# Patient Record
Sex: FEMALE | Race: BLACK OR AFRICAN AMERICAN | HISPANIC OR LATINO | Employment: FULL TIME | URBAN - METROPOLITAN AREA
[De-identification: names, ages, dates, MRNs, and addresses within clinical notes are randomized per-mention and may not be internally consistent; named-entity substitution may affect disease eponyms.]

---

## 2022-09-29 ENCOUNTER — OFFICE VISIT (OUTPATIENT)
Dept: FAMILY MEDICINE CLINIC | Facility: CLINIC | Age: 52
End: 2022-09-29
Payer: COMMERCIAL

## 2022-09-29 VITALS
DIASTOLIC BLOOD PRESSURE: 90 MMHG | OXYGEN SATURATION: 98 % | RESPIRATION RATE: 16 BRPM | SYSTOLIC BLOOD PRESSURE: 160 MMHG | HEIGHT: 67 IN | BODY MASS INDEX: 33.59 KG/M2 | WEIGHT: 214 LBS | HEART RATE: 85 BPM

## 2022-09-29 DIAGNOSIS — I10 BENIGN ESSENTIAL HYPERTENSION: ICD-10-CM

## 2022-09-29 DIAGNOSIS — G47.00 INSOMNIA, UNSPECIFIED TYPE: ICD-10-CM

## 2022-09-29 DIAGNOSIS — F41.8 DEPRESSION WITH ANXIETY: Primary | ICD-10-CM

## 2022-09-29 PROBLEM — Z80.0 FAMILY HISTORY OF COLON CANCER IN MOTHER: Status: ACTIVE | Noted: 2020-11-09

## 2022-09-29 PROBLEM — D24.2 FIBROADENOMA OF LEFT BREAST: Status: ACTIVE | Noted: 2020-12-08

## 2022-09-29 PROCEDURE — 99204 OFFICE O/P NEW MOD 45 MIN: CPT | Performed by: FAMILY MEDICINE

## 2022-09-29 RX ORDER — METOPROLOL TARTRATE 50 MG/1
50 TABLET, FILM COATED ORAL 2 TIMES DAILY
COMMUNITY

## 2022-09-29 RX ORDER — IBUPROFEN 800 MG/1
800 TABLET ORAL EVERY 6 HOURS PRN
COMMUNITY
Start: 2022-08-22 | End: 2022-09-29 | Stop reason: ALTCHOICE

## 2022-09-29 RX ORDER — OMEPRAZOLE 20 MG/1
20 TABLET, DELAYED RELEASE ORAL DAILY
COMMUNITY
Start: 2022-06-16 | End: 2022-10-12 | Stop reason: SDUPTHER

## 2022-09-29 RX ORDER — ROSUVASTATIN CALCIUM 20 MG/1
TABLET, COATED ORAL
COMMUNITY
Start: 2022-05-04

## 2022-09-29 RX ORDER — ACETAMINOPHEN 500 MG
500 TABLET ORAL EVERY 6 HOURS PRN
COMMUNITY

## 2022-09-29 RX ORDER — CELECOXIB 200 MG/1
200 CAPSULE ORAL 2 TIMES DAILY
COMMUNITY
Start: 2022-06-16 | End: 2023-06-16

## 2022-09-29 RX ORDER — LOSARTAN POTASSIUM 100 MG/1
100 TABLET ORAL DAILY
COMMUNITY
End: 2022-10-12 | Stop reason: SDUPTHER

## 2022-09-29 RX ORDER — SERTRALINE HYDROCHLORIDE 25 MG/1
25 TABLET, FILM COATED ORAL DAILY
Qty: 30 TABLET | Refills: 5 | Status: SHIPPED | OUTPATIENT
Start: 2022-09-29 | End: 2022-10-12 | Stop reason: DRUGHIGH

## 2022-09-29 NOTE — ASSESSMENT & PLAN NOTE
She says she has history of depression many years ago but she did not get any treatment now due to situation she is in separation with the partner and have been under more stress she lost her home now she is living with her daughter, she is taking her blood pressure medicine but blood pressure is up because she cannot sleep due to the stress and she is tearful  She needs a form to be filled for the [facility] as she has not been working since end of August and she needs a documentation from the doctor  She says she will bring the form next time

## 2022-09-29 NOTE — PROGRESS NOTES
Name: Go Lynch      : 1970      MRN: [id number]  Encounter Provider: Anibal Pavon MD  Encounter Date: 2022   Encounter department: [facility]     1  Depression with anxiety  Assessment & Plan:  She says she has history of depression many years ago but she did not get any treatment now due to situation she is in separation with the partner and have been under more stress she lost her home now she is living with her daughter, she is taking her blood pressure medicine but blood pressure is up because she cannot sleep due to the stress and she is tearful  She needs a form to be filled for the [facility] as she has not been working since  and she needs a documentation from the doctor  She says she will bring the form next time    Orders:  -     sertraline (Zoloft) 25 mg tablet; Take 1 tablet (25 mg total) by mouth daily  -     Ambulatory Referral to [facility]; Future    2  Benign essential hypertension  Assessment & Plan: On metoprolol 50 mg twice a day and she says her blood pressure is up as she could not sleep      3  Insomnia, unspecified type  Assessment & Plan:  High stress due to separation, and unable to sleep and focus and feels depressed and tearful    Orders:  -     Ambulatory Referral to [facility];  Future    Referral made to a counselor     interpretor service used   Dates she is off from  - 10/13  Works in [address] [place] [address]     She is a new patient, she is [place]-speaking and needs , she says she had in relationship for many years and now she is in separation, she is going through a lot she has been very anxious tearful depress cannot work and she has not been seeing any counselor, she works in [facility] and since  she has not been working and they needed documentation from the doctor and forms need to be filled that what condition she is going through, and she says the blood pressure is up because she cannot sleep , due to anxiety, she says she is also not living in her own home she is now living with her daughter which she is increasing her stress    Review of Systems   Constitutional: Negative for activity change, appetite change, chills, fatigue, fever and unexpected weight change  HENT: Negative for congestion, ear discharge, ear pain, nosebleeds, postnasal drip, rhinorrhea, sinus pressure, sneezing, sore throat, trouble swallowing and voice change  Eyes: Negative for photophobia, pain, discharge, redness and itching  Respiratory: Negative for cough, chest tightness, shortness of breath and wheezing  Cardiovascular: Negative for chest pain, palpitations and leg swelling  Gastrointestinal: Negative for abdominal pain, constipation, diarrhea, nausea and vomiting  Endocrine: Negative for polyuria  Genitourinary: Negative for dysuria, frequency and urgency  Musculoskeletal: Negative for arthralgias, back pain, myalgias and neck pain  Skin: Negative for color change, pallor and rash  Allergic/Immunologic: Negative for environmental allergies and food allergies  Neurological: Negative for dizziness, weakness, light-headedness and headaches  Hematological: Negative for adenopathy  Does not bruise/bleed easily  Psychiatric/Behavioral: Positive for decreased concentration, dysphoric mood and sleep disturbance  Negative for behavioral problems  The patient is nervous/anxious  Past Medical History:   Diagnosis Date    Hypertension      History reviewed  No pertinent surgical history  History reviewed  No pertinent family history    Social History     Socioeconomic History    Marital status: Single     Spouse name: None    Number of children: None    Years of education: None    Highest education level: None   Occupational History    None   Tobacco Use    Smoking status: Never Smoker    Smokeless tobacco: None   Substance and Sexual Activity  Alcohol use: Never    Drug use: None    Sexual activity: None   Other Topics Concern    None   Social History Narrative    None     Social Determinants of Health     Financial Resource Strain: Not on file   Food Insecurity: Not on file   Transportation Needs: Not on file   Physical Activity: Not on file   Stress: Not on file   Social Connections: Not on file   Intimate Partner Violence: Not on file   Housing Stability: Not on file     Current Outpatient Medications on File Prior to Visit   Medication Sig    acetaminophen (TYLENOL) 500 mg tablet Take 500 mg by mouth every 6 (six) hours as needed    celecoxib (CeleBREX) 200 mg capsule Take 200 mg by mouth 2 (two) times a day    Diclofenac Sodium (VOLTAREN) 1 % Apply 1 application topically    losartan (COZAAR) 100 MG tablet Take 100 mg by mouth daily    metoprolol tartrate (LOPRESSOR) 50 mg tablet Take 50 mg by mouth 2 (two) times a day    omeprazole (PriLOSEC OTC) 20 MG tablet Take 20 mg by mouth daily    [DISCONTINUED] ibuprofen (MOTRIN) 800 mg tablet Take 800 mg by mouth every 6 (six) hours as needed    celecoxib (CeleBREX) 200 mg capsule Take 200 mg by mouth 2 (two) times a day    rosuvastatin (CRESTOR) 20 MG tablet  (Patient not taking: Reported on 9/29/2022)     No Known Allergies  Immunization History   Administered Date(s) Administered    COVID-19 MODERNA VACC 0 5 ML IM 04/29/2021       Objective     /90   Pulse 85   Resp 16   Ht 5' 7" (1 702 m)   Wt 97 1 kg (214 lb)   SpO2 98%   BMI 33 52 kg/m²     Physical Exam  Vitals and nursing note reviewed  Constitutional:       Appearance: She is well-developed  HENT:      Head: Normocephalic and atraumatic  Eyes:      General: No scleral icterus  Conjunctiva/sclera: Conjunctivae normal       Pupils: Pupils are equal, round, and reactive to light  Neck:      Thyroid: No thyromegaly  Cardiovascular:      Rate and Rhythm: Normal rate     Pulmonary:      Effort: Pulmonary effort is normal       Breath sounds: Normal breath sounds  No wheezing or rales  Musculoskeletal:      Cervical back: Normal range of motion and neck supple  Skin:     Findings: No erythema or rash  Neurological:      Mental Status: She is alert     Psychiatric:      Comments: Tearful and anxious       Glo Hernandez MD

## 2022-10-05 ENCOUNTER — TELEPHONE (OUTPATIENT)
Dept: PSYCHIATRY | Facility: CLINIC | Age: 52
End: 2022-10-05

## 2022-10-05 NOTE — TELEPHONE ENCOUNTER
Used interpretor services to contact patient in regards to patient referral  Inform patient of the wait list and have added patient to wait list for talk therapy  Suggested calling insurance for other providers along with ER if needed

## 2022-10-12 ENCOUNTER — OFFICE VISIT (OUTPATIENT)
Dept: FAMILY MEDICINE CLINIC | Facility: CLINIC | Age: 52
End: 2022-10-12
Payer: COMMERCIAL

## 2022-10-12 VITALS
WEIGHT: 214 LBS | HEART RATE: 73 BPM | RESPIRATION RATE: 16 BRPM | DIASTOLIC BLOOD PRESSURE: 90 MMHG | HEIGHT: 67 IN | OXYGEN SATURATION: 99 % | BODY MASS INDEX: 33.59 KG/M2 | SYSTOLIC BLOOD PRESSURE: 140 MMHG

## 2022-10-12 DIAGNOSIS — G47.00 INSOMNIA, UNSPECIFIED TYPE: ICD-10-CM

## 2022-10-12 DIAGNOSIS — Z23 NEEDS FLU SHOT: ICD-10-CM

## 2022-10-12 DIAGNOSIS — I10 BENIGN ESSENTIAL HYPERTENSION: ICD-10-CM

## 2022-10-12 DIAGNOSIS — K21.9 GASTROESOPHAGEAL REFLUX DISEASE WITHOUT ESOPHAGITIS: ICD-10-CM

## 2022-10-12 DIAGNOSIS — F41.8 DEPRESSION WITH ANXIETY: Primary | ICD-10-CM

## 2022-10-12 PROCEDURE — 90471 IMMUNIZATION ADMIN: CPT | Performed by: FAMILY MEDICINE

## 2022-10-12 PROCEDURE — 90682 RIV4 VACC RECOMBINANT DNA IM: CPT | Performed by: FAMILY MEDICINE

## 2022-10-12 PROCEDURE — 99214 OFFICE O/P EST MOD 30 MIN: CPT | Performed by: FAMILY MEDICINE

## 2022-10-12 RX ORDER — OMEPRAZOLE 20 MG/1
20 TABLET, DELAYED RELEASE ORAL DAILY
Qty: 90 TABLET | Refills: 1 | Status: SHIPPED | OUTPATIENT
Start: 2022-10-12

## 2022-10-12 RX ORDER — LOSARTAN POTASSIUM 100 MG/1
100 TABLET ORAL DAILY
Qty: 90 TABLET | Refills: 1 | Status: SHIPPED | OUTPATIENT
Start: 2022-10-12

## 2022-10-12 NOTE — ASSESSMENT & PLAN NOTE
Her insomnia is mostly due to stress related depression anxiety due to her situation of separation, she has been taking Zoloft 25 mg for 10 days, will increase the dose 50 mg daily    And then advised to really check in 3 weeks and a work note is given for excuse for 3 weeks from now and she will be re-evaluated in 3 weeks

## 2022-10-12 NOTE — PROGRESS NOTES
Name: Ximena Pugh      : 1970      MRN: [id number]  Encounter Provider: Uday Jack MD  Encounter Date: 10/12/2022   Encounter department: [facility]     1  Depression with anxiety  Assessment & Plan:  She was started on Zoloft 25 mg  10 days ago and will increase the dose 50 mg as she still feel not significant improvement and she has extended a note for work excuse given for 3 more weeks so she start feeling better    Orders:  -     sertraline (Zoloft) 50 mg tablet; Take 1 tablet (50 mg total) by mouth daily    2  Needs flu shot  -     influenza vaccine, quadrivalent, recombinant, PF, 0 5 mL, for patients 18 yr+ (FLUBLOK)    3  Gastroesophageal reflux disease without esophagitis  -     omeprazole (PriLOSEC OTC) 20 MG tablet; Take 1 tablet (20 mg total) by mouth daily    4  Benign essential hypertension  -     losartan (COZAAR) 100 MG tablet; Take 1 tablet (100 mg total) by mouth daily    5  Insomnia, unspecified type  Assessment & Plan:  Her insomnia is mostly due to stress related depression anxiety due to her situation of separation, she has been taking Zoloft 25 mg for 10 days, will increase the dose 50 mg daily  And then advised to really check in 3 weeks and a work note is given for excuse for 3 weeks from now and she will be re-evaluated in 3 weeks        BMI Counseling: Body mass index is 33 52 kg/m²  The BMI is above normal  Nutrition recommendations include decreasing portion sizes  Rationale for BMI follow-up plan is due to patient being overweight or obese  Subjective     She is here for follow-up, she was started on Zoloft 25 mg 10 days ago because of her high stress due to separation, she speaks [place] and  service was used    She says she is taking Zoloft at bedtime and she can sleep only 3 hours then she wakes up and her mind does not shorten she keeps thinking about things and she is stressed she does not feel well she feel depressed, and she says she had taken the latter and she has to return back to work now as she works in [address] [employer] [address] but she feels she is still not able to concentrate and does not feel quite normal she needs few more weeks to feel better so she can go back to work  She says her lack of sleep also fact her mood  Review of Systems   Constitutional: Negative for activity change, appetite change, chills, fatigue, fever and unexpected weight change  HENT: Negative for congestion, ear discharge, ear pain, nosebleeds, postnasal drip, rhinorrhea, sinus pressure, sneezing, sore throat, trouble swallowing and voice change  Eyes: Negative for photophobia, pain, discharge, redness and itching  Respiratory: Negative for cough, chest tightness, shortness of breath and wheezing  Cardiovascular: Negative for chest pain, palpitations and leg swelling  Gastrointestinal: Negative for abdominal pain, constipation, diarrhea, nausea and vomiting  Endocrine: Negative for polyuria  Genitourinary: Negative for dysuria, frequency and urgency  Musculoskeletal: Negative for arthralgias, back pain, myalgias and neck pain  Skin: Negative for color change, pallor and rash  Allergic/Immunologic: Negative for environmental allergies and food allergies  Neurological: Negative for dizziness, weakness, light-headedness and headaches  Hematological: Negative for adenopathy  Does not bruise/bleed easily  Psychiatric/Behavioral: Positive for dysphoric mood and sleep disturbance  Negative for behavioral problems  The patient is nervous/anxious  Past Medical History:   Diagnosis Date   • Hypertension      History reviewed  No pertinent surgical history  History reviewed  No pertinent family history    Social History     Socioeconomic History   • Marital status: Single     Spouse name: None   • Number of children: None   • Years of education: None   • Highest education level: None   Occupational History   • None Tobacco Use   • Smoking status: Never Smoker   • Smokeless tobacco: Never Used   Substance and Sexual Activity   • Alcohol use: Never   • Drug use: None   • Sexual activity: None   Other Topics Concern   • None   Social History Narrative   • None     Social Determinants of Health     Financial Resource Strain: Not on file   Food Insecurity: Not on file   Transportation Needs: Not on file   Physical Activity: Not on file   Stress: Not on file   Social Connections: Not on file   Intimate Partner Violence: Not on file   Housing Stability: Not on file     Current Outpatient Medications on File Prior to Visit   Medication Sig   • acetaminophen (TYLENOL) 500 mg tablet Take 500 mg by mouth every 6 (six) hours as needed   • celecoxib (CeleBREX) 200 mg capsule Take 200 mg by mouth 2 (two) times a day   • Diclofenac Sodium (VOLTAREN) 1 % Apply 1 application topically   • metoprolol tartrate (LOPRESSOR) 50 mg tablet Take 50 mg by mouth 2 (two) times a day   • [DISCONTINUED] losartan (COZAAR) 100 MG tablet Take 100 mg by mouth daily   • [DISCONTINUED] omeprazole (PriLOSEC OTC) 20 MG tablet Take 20 mg by mouth daily   • [DISCONTINUED] sertraline (Zoloft) 25 mg tablet Take 1 tablet (25 mg total) by mouth daily   • rosuvastatin (CRESTOR) 20 MG tablet  (Patient not taking: No sig reported)     No Known Allergies  Immunization History   Administered Date(s) Administered   • COVID-19 MODERNA VACC 0 5 ML IM 04/29/2021   • Influenza, recombinant, quadrivalent,injectable, preservative free 10/12/2022       Objective     /90   Pulse 73   Resp 16   Ht 5' 7" (1 702 m)   Wt 97 1 kg (214 lb)   SpO2 99%   BMI 33 52 kg/m²     Physical Exam  Vitals and nursing note reviewed  Constitutional:       Appearance: She is well-developed  HENT:      Head: Normocephalic  Eyes:      General: No scleral icterus  Conjunctiva/sclera: Conjunctivae normal       Pupils: Pupils are equal, round, and reactive to light     Neck: Thyroid: No thyromegaly  Cardiovascular:      Rate and Rhythm: Normal rate and regular rhythm  Heart sounds: Normal heart sounds  No murmur heard  Pulmonary:      Effort: Pulmonary effort is normal       Breath sounds: Normal breath sounds  No wheezing or rales  Musculoskeletal:      Cervical back: Normal range of motion and neck supple  Right lower leg: No edema  Left lower leg: No edema  Lymphadenopathy:      Cervical: No cervical adenopathy  Skin:     Findings: No erythema or rash  Neurological:      Mental Status: She is alert     Psychiatric:      Comments: Anxious       Lux Mendoza MD

## 2022-10-12 NOTE — ASSESSMENT & PLAN NOTE
She was started on Zoloft 25 mg  10 days ago and will increase the dose 50 mg as she still feel not significant improvement and she has extended a note for work excuse given for 3 more weeks so she start feeling better

## 2023-03-21 ENCOUNTER — APPOINTMENT (EMERGENCY)
Dept: VASCULAR ULTRASOUND | Facility: HOSPITAL | Age: 53
End: 2023-03-21

## 2023-03-21 ENCOUNTER — APPOINTMENT (EMERGENCY)
Dept: RADIOLOGY | Facility: HOSPITAL | Age: 53
End: 2023-03-21

## 2023-03-21 ENCOUNTER — HOSPITAL ENCOUNTER (EMERGENCY)
Facility: HOSPITAL | Age: 53
Discharge: HOME | End: 2023-03-21
Attending: EMERGENCY MEDICINE | Admitting: EMERGENCY MEDICINE

## 2023-03-21 VITALS
WEIGHT: 198.63 LBS | OXYGEN SATURATION: 100 % | SYSTOLIC BLOOD PRESSURE: 192 MMHG | BODY MASS INDEX: 31.11 KG/M2 | TEMPERATURE: 98 F | HEART RATE: 82 BPM | RESPIRATION RATE: 16 BRPM | DIASTOLIC BLOOD PRESSURE: 86 MMHG

## 2023-03-21 DIAGNOSIS — M25.562 LEFT KNEE PAIN, UNSPECIFIED CHRONICITY: Primary | ICD-10-CM

## 2023-03-21 RX ORDER — LIDOCAINE 50 MG/G
1 PATCH TOPICAL EVERY 24 HOURS
Qty: 15 PATCH | Refills: 0 | Status: SHIPPED | OUTPATIENT
Start: 2023-03-21

## 2023-03-21 RX ORDER — ACETAMINOPHEN 500 MG
1000 TABLET ORAL EVERY 8 HOURS PRN
Qty: 60 TABLET | Refills: 0 | Status: SHIPPED | OUTPATIENT
Start: 2023-03-21

## 2023-03-21 RX ORDER — ACETAMINOPHEN 325 MG/1
975 TABLET ORAL ONCE
Status: COMPLETED | OUTPATIENT
Start: 2023-03-21 | End: 2023-03-21

## 2023-03-21 RX ORDER — KETOROLAC TROMETHAMINE 30 MG/ML
15 INJECTION, SOLUTION INTRAMUSCULAR; INTRAVENOUS ONCE
Status: COMPLETED | OUTPATIENT
Start: 2023-03-21 | End: 2023-03-21

## 2023-03-21 RX ADMIN — DICLOFENAC SODIUM 2 G: 10 GEL TOPICAL at 12:26

## 2023-03-21 RX ADMIN — KETOROLAC TROMETHAMINE 15 MG: 30 INJECTION, SOLUTION INTRAMUSCULAR; INTRAVENOUS at 11:33

## 2023-03-21 RX ADMIN — ACETAMINOPHEN 975 MG: 325 TABLET ORAL at 11:32

## 2023-03-21 NOTE — Clinical Note
Jace Garcia accompanied Jessica Mercado to the emergency department on 3/21/2023  Return date if applicable:     Please excuse this individual on March 21, 2023  If you have any questions or concerns, please don't hesitate to call        Hilda Fonseca PA-C

## 2023-03-21 NOTE — Clinical Note
Vandana Bangura was seen and treated in our emergency department on 3/21/2023  Diagnosis:     Sahra Maier    She may return on this date: If you have any questions or concerns, please don't hesitate to call        Chikis Yuan DO    ______________________________           _______________          _______________  Hospital Representative                              Date                                Time

## 2023-03-21 NOTE — DISCHARGE INSTRUCTIONS
Please return to the emergency department for worsening symptoms including chest pain, shortness of breath, dizziness, lightheadedness, fever greater than 103, severe pain, inability to walk, fainting episodes, etc  Please follow-up with your family practice provider as soon as possible  I have sent medications over to the pharmacy for your symptoms  Please take as directed  Please follow-up with orthopedics as soon as possible  You may require an MRI  I have given you a referral in addition to information to follow-up with an orthopedist down here  Please call and make an appointment

## 2023-03-21 NOTE — ED PROVIDER NOTES
History  Chief Complaint   Patient presents with   • Leg Pain     Pt c/o of pain behind left knee since November when she had a knee replacement  Pt does physical therapy but son reports no improvement  Pt reports increased pain, especially at night  This is a 49-year-old female with past medical history significant for hypertension presenting to the emergency department today for left-sided knee pain  She notes this has been ongoing for numerous months  She had her knee replacement in November 2022 and had a subsequent manipulation under anesthesia  The patient has been taking meloxicam and narcotic pain medications at home with some relief of symptoms  The patient's son at bedside  notes medial knee pain that is worse at night and occasionally wakes her up from sleep  She denies any lower extremity swelling  She has no chest pain or shortness of breath  She has no history of pulmonary emboli or venous thromboemboli  She does note left calf pain  She denies any trauma to the knee or recent falls  She has been going to physical therapy and has been ambulating with a cane per the recommendations  She denies any fever or chills  The patient denies other complaints at this time  History provided by:  Patient   used: No    Leg Pain  Location:  Knee  Injury: no    Knee location:  L knee  Dislocation: no    Tetanus status:  Unknown  Prior injury to area:  No  Relieved by:  Nothing  Worsened by:  Nothing  Ineffective treatments:  None tried  Associated symptoms: decreased ROM (2/2 pain) and stiffness    Associated symptoms: no back pain, no fatigue, no fever, no itching, no muscle weakness, no neck pain, no numbness, no swelling and no tingling        Prior to Admission Medications   Prescriptions Last Dose Informant Patient Reported? Taking?    Diclofenac Sodium (VOLTAREN) 1 %   Yes No   Sig: Apply 1 application topically   acetaminophen (TYLENOL) 500 mg tablet   Yes No   Sig: Take 500 mg by mouth every 6 (six) hours as needed   celecoxib (CeleBREX) 200 mg capsule   Yes No   Sig: Take 200 mg by mouth 2 (two) times a day   losartan (COZAAR) 100 MG tablet   No No   Sig: Take 1 tablet (100 mg total) by mouth daily   metoprolol tartrate (LOPRESSOR) 50 mg tablet   Yes No   Sig: Take 50 mg by mouth 2 (two) times a day   omeprazole (PriLOSEC OTC) 20 MG tablet   No No   Sig: Take 1 tablet (20 mg total) by mouth daily   rosuvastatin (CRESTOR) 20 MG tablet   Yes No   Patient not taking: No sig reported   sertraline (Zoloft) 50 mg tablet   No No   Sig: Take 1 tablet (50 mg total) by mouth daily      Facility-Administered Medications: None       Past Medical History:   Diagnosis Date   • Hypertension        Past Surgical History:   Procedure Laterality Date   • KNEE ARTHROPLASTY Left        No family history on file  I have reviewed and agree with the history as documented  E-Cigarette/Vaping     E-Cigarette/Vaping Substances     Social History     Tobacco Use   • Smoking status: Never   • Smokeless tobacco: Never   Substance Use Topics   • Alcohol use: Never       Review of Systems   Constitutional: Negative for appetite change, chills, diaphoresis, fatigue and fever  Eyes: Negative for visual disturbance  Respiratory: Negative for cough, chest tightness, shortness of breath and wheezing  Cardiovascular: Negative for chest pain, palpitations and leg swelling  Gastrointestinal: Negative for abdominal pain, constipation, diarrhea, nausea and vomiting  Musculoskeletal: Positive for arthralgias (left knee) and stiffness  Negative for back pain, neck pain and neck stiffness  Skin: Negative for itching, rash and wound  Neurological: Negative for dizziness, seizures, syncope, weakness, light-headedness, numbness and headaches  Psychiatric/Behavioral: Negative for confusion  All other systems reviewed and are negative  Physical Exam  Physical Exam  Vitals and nursing note reviewed  Constitutional:       General: She is not in acute distress  Appearance: Normal appearance  She is normal weight  She is not ill-appearing, toxic-appearing or diaphoretic  HENT:      Head: Normocephalic and atraumatic  Nose: Nose normal  No congestion or rhinorrhea  Mouth/Throat:      Mouth: Mucous membranes are moist       Pharynx: No oropharyngeal exudate or posterior oropharyngeal erythema  Eyes:      General: No scleral icterus  Right eye: No discharge  Left eye: No discharge  Extraocular Movements: Extraocular movements intact  Pupils: Pupils are equal, round, and reactive to light  Cardiovascular:      Rate and Rhythm: Normal rate and regular rhythm  Pulses: Normal pulses  Heart sounds: Normal heart sounds  No murmur heard  No friction rub  No gallop  Comments: 2+ DP pulses bilaterally  Pulmonary:      Effort: Pulmonary effort is normal  No respiratory distress  Breath sounds: Normal breath sounds  No stridor  No wheezing, rhonchi or rales  Chest:      Chest wall: No tenderness  Musculoskeletal:         General: Normal range of motion  Cervical back: Normal range of motion  No tenderness  Right lower leg: No edema  Left lower leg: No edema  Comments: The patient has decreased range of motion to flexion and extension of the left knee secondary to pain; I do not hear any clicks or pops on examination; negative Lachman  The patient has tenderness to palpation diffusely throughout the left knee; negative ballottement  The patient has no redness, swelling, warmth, and fluctuance to suspect septic joint; midline scar is well-healed without any evidence of dehiscence or infection  Negative Homans' sign bilaterally without swelling   Skin:     General: Skin is warm and dry  Capillary Refill: Capillary refill takes less than 2 seconds  Coloration: Skin is not jaundiced or pale     Neurological:      General: No focal deficit present  Mental Status: She is alert and oriented to person, place, and time  Mental status is at baseline  Comments: Normal sensation to the distal bilateral lower extremities   Psychiatric:         Mood and Affect: Mood normal          Behavior: Behavior normal          Vital Signs  ED Triage Vitals [03/21/23 1056]   Temperature Pulse Respirations Blood Pressure SpO2   98 °F (36 7 °C) 82 16 (!) 192/86 100 %      Temp Source Heart Rate Source Patient Position - Orthostatic VS BP Location FiO2 (%)   Oral Monitor Lying Right arm --      Pain Score       8           Vitals:    03/21/23 1056   BP: (!) 192/86   Pulse: 82   Patient Position - Orthostatic VS: Lying         Visual Acuity      ED Medications  Medications   Diclofenac Sodium (VOLTAREN) 1 % topical gel 2 g (2 g Topical Given 3/21/23 1226)   ketorolac (TORADOL) injection 15 mg (15 mg Intramuscular Given 3/21/23 1133)   acetaminophen (TYLENOL) tablet 975 mg (975 mg Oral Given 3/21/23 1132)       Diagnostic Studies  Results Reviewed     None                 XR knee 4+ views left injury   ED Interpretation by Geoffrey Burger PA-C (03/21 1218)   Hardware appears in places; no acute osseous abnormalities      VAS lower limb venous duplex study, unilateral/limited    (Results Pending)              Procedures  Procedures         ED Course  ED Course as of 03/21/23 1313   Tue Mar 21, 2023   1217 Negative for DVT per Sidney Reddy vascular technician  Medical Decision Making  This is a 61-year-old female presenting to the emergency department today for left-sided knee pain  She had a knee replacement in November of 2022 with subsequent examination under anesthesia  She notes worsening pain since  She has no systemic signs of infection  She has no chest pain or shortness of breath    Her vital signs show hypertension but patient does have a history of hypertension and did not take her antihypertensives today  The patient has tenderness to palpation throughout the left knee with mild decreased range of motion secondary to pain  There is no swelling or evidence of infection to the left knee  There is a negative Homans' sign bilaterally  X-ray of the left knee was interpreted by me and shows no acute osseous abnormality; hardware appears intact  Negative venous duplex to the left lower extremity  The patient is feeling better after Toradol and Voltaren cream   The patient is stable for discharge at this time  Follow-up outpatient with orthopedics for further management  RICE  Strict return precautions were given  Recommend PCP follow-up as soon as possible  The patient and/or patient's proxy verify their understanding and agree to the plan at this time  All questions answered to the patient and/or their proxy's satisfaction  All labs reviewed and utilized in the medical decision making process (if labs were ordered)  Portions of the record may have been created with voice recognition software   Occasional wrong word or "sound a like" substitutions may have occurred due to the inherent limitations of voice recognition software   Read the chart carefully and recognize, using context, where substitutions have occurred  Left knee pain, unspecified chronicity: complicated acute illness or injury  Amount and/or Complexity of Data Reviewed  Independent Historian: caregiver     Details: Son  Radiology: ordered and independent interpretation performed  Decision-making details documented in ED Course  Details: XR Left Knee      Risk  OTC drugs  Prescription drug management            Disposition  Final diagnoses:   Left knee pain, unspecified chronicity     Time reflects when diagnosis was documented in both MDM as applicable and the Disposition within this note     Time User Action Codes Description Comment    3/21/2023 12:42 PM Gladys Gant Click Add [L12 [id number]] Left knee pain, unspecified chronicity       ED Disposition     ED Disposition   Discharge    Condition   Stable    Date/Time   Tue Mar 21, 2023 12:42 PM    Comment   Laura Moore discharge to home/self care  Follow-up Information     Follow up With Specialties Details Why Contact Info Additional [address]  Jessica Nithin, DO Emergency Medicine Schedule an appointment as soon as possible for a visit   [address] [facility] Emergency Medicine Go to  If symptoms worsen [address] [facility] [address] [facility], [address], [place], [place], [address] [facility] Orthopedic Surgery Schedule an appointment as soon as possible for a visit   [address] [address] [phone] [address] [facility] [address] [facility] [place], [address] [place] [address] [place] [phone]          Discharge Medication List as of 3/21/2023 12:46 PM      START taking these medications    Details   !! acetaminophen (TYLENOL) 500 mg tablet Take 2 tablets (1,000 mg total) by mouth every 8 (eight) hours as needed for mild pain, Starting Tue 3/21/2023, Normal      lidocaine (LIDODERM) 5 % Apply 1 patch topically over 12 hours every 24 hours Remove & Discard patch within 12 hours or as directed by MD, Starting Tue 3/21/2023, Normal       !! - Potential duplicate medications found  Please discuss with provider        CONTINUE these medications which have NOT CHANGED    Details   !! acetaminophen (TYLENOL) 500 mg tablet Take 500 mg by mouth every 6 (six) hours as needed, Historical Med      celecoxib (CeleBREX) 200 mg capsule Take 200 mg by mouth 2 (two) times a day, Starting Thu 6/16/2022, Until Fri 6/16/2023, Historical Med      Diclofenac Sodium (VOLTAREN) 1 % Apply 1 application topically, Starting Thu 8/18/2022, Until Fri 8/18/2023 at 2359, Historical Med      losartan (COZAAR) 100 MG tablet Take 1 tablet (100 mg total) by mouth daily, Starting Wed 10/12/2022, Normal      metoprolol tartrate (LOPRESSOR) 50 mg tablet Take 50 mg by mouth 2 (two) times a day, Historical Med      omeprazole (PriLOSEC OTC) 20 MG tablet Take 1 tablet (20 mg total) by mouth daily, Starting Wed 10/12/2022, Normal      rosuvastatin (CRESTOR) 20 MG tablet Starting Wed 5/4/2022, Historical Med      sertraline (Zoloft) 50 mg tablet Take 1 tablet (50 mg total) by mouth daily, Starting Wed 10/12/2022, Normal       !! - Potential duplicate medications found  Please discuss with provider                PDMP Review       Value Time User    PDMP Reviewed  Yes 3/21/2023 12:37 PM Deborah Wells PA-C          ED Provider  Electronically Signed by           Deborah Wells PA-C  03/21/23 6895

## 2023-03-29 ENCOUNTER — TELEPHONE (OUTPATIENT)
Dept: OBGYN CLINIC | Facility: CLINIC | Age: 53
End: 2023-03-29

## 2023-03-29 NOTE — TELEPHONE ENCOUNTER
Caller: Ifrah Luque (Son)     Doctor: Dr Craig     Reason for call: Patient had a replacement of her left knee on 11/10/22  She is looking for a second opinion due to them not being happy with progress so far  Her records are in chart since was done at [facility]  Please advise if Dr Craig would be ok to see this patient        Call back#: [phone] (Patient does not speak english this is sons #)

## 2023-05-05 ENCOUNTER — OFFICE VISIT (OUTPATIENT)
Dept: OBGYN CLINIC | Facility: CLINIC | Age: 53
End: 2023-05-05

## 2023-05-05 VITALS
BODY MASS INDEX: 32.8 KG/M2 | DIASTOLIC BLOOD PRESSURE: 93 MMHG | HEIGHT: 67 IN | HEART RATE: 64 BPM | WEIGHT: 209 LBS | SYSTOLIC BLOOD PRESSURE: 151 MMHG

## 2023-05-05 DIAGNOSIS — Z96.652 HISTORY OF ARTHROPLASTY OF LEFT KNEE: Primary | ICD-10-CM

## 2023-05-05 RX ORDER — GABAPENTIN 300 MG/1
300 CAPSULE ORAL 3 TIMES DAILY
Qty: 90 CAPSULE | Refills: 0 | Status: SHIPPED | OUTPATIENT
Start: 2023-05-05

## 2023-05-05 RX ORDER — TRAMADOL HYDROCHLORIDE 50 MG/1
50 TABLET ORAL EVERY 6 HOURS PRN
Qty: 30 TABLET | Refills: 0 | Status: SHIPPED | OUTPATIENT
Start: 2023-05-05

## 2023-05-05 NOTE — PROGRESS NOTES
Orthopedic Surgery Office Note  Aretha Longoria (40 y o  female)   : 1970   MRN: [id number]   Encounter Date: 2023  Dr Errol Hopkins DO, Orthopedic Surgeon  Orthopedic Oncology & [facility] Surgery   Chief Complaint   Patient presents with    Left Knee - Pain       Assessment / Plan  Diagnoses and all orders for this visit:    History of arthroplasty of left knee  -     Ambulatory Referral to Orthopedic Surgery      Discussion:    Reviewed physical exam and imaging with patient on today's visit  She is s/p TKA of her left knee  Recommended that the patient continue physical therapy to improve range of motion and strength of her hip, knee, and core  Prescribed Tramadol and Gabapentin to assist with pain  Ordered a CT Scan to further investigate symptoms  She will follow-up once the CT scan is complete  The patient expresses understanding and is in agreement with today's treatment plan   There were no radiographic evidence for the her continued pain, physical exam she has a stable knee from 5 degrees to 90 degrees with continuous pain throughout all range of motion but extensor mechanism is intact, valgus and varus stresses intact  No mechanical reason for this pain so a CT will be ordered in order to delineate any other possibilities  Surgery:    No surgery planned at this time    Plan:   · Activity as tolerated  · Continue outpatient PT  · Prescription given for Tramadol prn severe pain  Prescription given for Gabapentin    No follow-ups on file  History of Present Illness: Aretha Longoria is a 45 y o  female who presents with left knee pain  An interpretor was used to assist with today's visit  The patient is 6 months s/p total knee arthroplasty of her left knee, done by an outside provider  On today's visit, the patient reports difficulty and extreme pain with any range of motion   She states that she has been compliant with physical therapy, however, she has consistent pain and "stiffness  She reported having a manipulation done in March 2023, but she states that did not assist with her symptoms  She reports significant pain at rest and \"paralyzing\" pain with weightbearing activity  She complains of a lack of knee extension, which has caused an inability to ambulate freely  She reports using a cane as an assistive device  She reports that her pain has never improved following surgery  She states that the range of motion exercises in physical therapy are extremely painful  She reports a decrease in activities of daily living including weightbearing activity and driving  She was taking ibuprofen for pain, however, she had to discontinue due to elevated blood pressure  She denies numbness, tingling, or instability  Review of Systems  Constitutional: Negative for fatigue, fever or loss of appetite  HENT: Negative  Respiratory: Negative for shortness of breath, dyspnea  Cardiovascular: Negative for chest pain/tightness  Gastrointestinal: Negative for abdominal pain, N/V  Endocrine: Negative for cold/heat intolerance, unexplained weight loss/gain  Genitourinary: Negative for flank pain, dysuria  Musculoskeletal:  Positive for arthralgia   Skin: Negative for rash  Neurological:  Negative for numbness or tingling  Psychiatric/Behavioral: Negative for agitation  Physical Exam  /93   Pulse 64   Ht 5' 7\" (1 702 m)   Wt 94 8 kg (209 lb)   BMI 32 73 kg/m²   Cons: Appears well  No apparent distress  Psych: Alert  Oriented x3  Mood and affect normal   Eyes: PERRLA, EOMI  Resp: Normal effort  No audible wheezing or stridor  CV: Extremities warm and well perfused  No LE edema  Trace LE edema  Skin: Warm  No visible lesions  Neuro: Normal muscle tone        Orthopedic Exam:   Left knee - generalized  Inspection:   Patient ambulates with antalgic gait pattern  Uses No assistive device - states that she usually uses a cane  No anatomical deformity  Skin is warm and dry " to touch with no signs of erythema, ecchymosis, or infection   Mild generalized soft tissue swelling or effusion noted  Palpation: TTP medial joint line  TTP posterior knee and anterior knee  ROM: (5° - 90°)   Motor: Intact  Sensation: Intact  Special tests:   Strength 4/5 throughout  Flexor and extensor mechanisms are intact   Knee is stable to varus and valgus stress  - Lachman's  - Anterior Drawer, - Posterior Drawer  Calf compartments are soft and supple  - Elliott's sign  2+ DP and PT pulses with brisk capillary refill to the toes  Sural, saphenous, tibial, superficial, and deep peroneal motor and sensory distributions intact  Sensation light touch intact distally      Studies Reviewed  Study type: XRAY left knee(s)  Date: 3/21/23  I have read and reviewed radiology report and agree with the interpretation  My interpretation is as follows:  well-seated total knee prosthesis with maintained anatomical alignment and no signs of loosening  Procedures  No procedures today  Medical, Surgical, Family, and Social History  The patient's medical history, family history, and social history, were reviewed and updated as appropriate  Past Medical History:   Diagnosis Date    Hypertension      Past Surgical History:   Procedure Laterality Date    KNEE ARTHROPLASTY Left      No family history on file    Social History     Occupational History    Not on file   Tobacco Use    Smoking status: Never    Smokeless tobacco: Never   Substance and Sexual Activity    Alcohol use: Never    Drug use: Not on file    Sexual activity: Not on file     No Known Allergies    Current Outpatient Medications:     acetaminophen (TYLENOL) 500 mg tablet, Take 500 mg by mouth every 6 (six) hours as needed, Disp: , Rfl:     acetaminophen (TYLENOL) 500 mg tablet, Take 2 tablets (1,000 mg total) by mouth every 8 (eight) hours as needed for mild pain (Patient not taking: Reported on 4/21/2023), Disp: 60 tablet, Rfl: 0    aspirin (ECOTRIN LOW STRENGTH) 81 mg EC tablet, Take 81 mg by mouth daily, Disp: , Rfl:     celecoxib (CeleBREX) 200 mg capsule, Take 200 mg by mouth 2 (two) times a day, Disp: , Rfl:     Diclofenac Sodium (VOLTAREN) 1 %, Apply 1 application topically, Disp: , Rfl:     gabapentin (NEURONTIN) 100 mg capsule, , Disp: , Rfl:     lidocaine (LIDODERM) 5 %, Apply 1 patch topically over 12 hours every 24 hours Remove & Discard patch within 12 hours or as directed by MD, Disp: 15 patch, Rfl: 0    losartan (COZAAR) 100 MG tablet, Take 1 tablet (100 mg total) by mouth daily, Disp: 90 tablet, Rfl: 1    meloxicam (MOBIC) 7 5 mg tablet, , Disp: , Rfl:     metoprolol tartrate (LOPRESSOR) 50 mg tablet, Take 50 mg by mouth 2 (two) times a day, Disp: , Rfl:     omeprazole (PriLOSEC OTC) 20 MG tablet, Take 1 tablet (20 mg total) by mouth daily, Disp: 90 tablet, Rfl: 1    rosuvastatin (CRESTOR) 20 MG tablet, , Disp: , Rfl:     senna-docusate sodium (SENOKOT S) 8 6-50 mg per tablet, Take 1 tablet by mouth 2 (two) times a day, Disp: , Rfl:     sertraline (Zoloft) 50 mg tablet, Take 1 tablet (50 mg total) by mouth daily, Disp: 30 tablet, Rfl: 1    terbinafine (LamISIL) 250 mg tablet, , Disp: , Rfl:     traMADol (ULTRAM) 50 mg tablet, Take 50 mg by mouth every 6 (six) hours as needed, Disp: , Rfl:     30 minutes was spent in the coordination of care, reviewing of imaging and with the patient on the date of service    Seth Rice    Scribe Attestation    I,:   am acting as a scribe while in the presence of the attending physician :       I,:   personally performed the services described in this documentation    as scribed in my presence :

## 2023-05-10 ENCOUNTER — TELEPHONE (OUTPATIENT)
Dept: OBGYN CLINIC | Facility: HOSPITAL | Age: 53
End: 2023-05-10

## 2023-05-10 NOTE — TELEPHONE ENCOUNTER
Caller: patient    Doctor: Gwendel Felty    Reason for call: transferred to central scheduling    Call back#: n/a

## 2023-05-19 ENCOUNTER — HOSPITAL ENCOUNTER (OUTPATIENT)
Dept: CT IMAGING | Facility: HOSPITAL | Age: 53
Discharge: HOME | End: 2023-05-19
Attending: STUDENT IN AN ORGANIZED HEALTH CARE EDUCATION/TRAINING PROGRAM

## 2023-05-19 DIAGNOSIS — Z96.652 HISTORY OF ARTHROPLASTY OF LEFT KNEE: ICD-10-CM

## 2023-05-30 ENCOUNTER — TELEPHONE (OUTPATIENT)
Dept: OBGYN CLINIC | Facility: HOSPITAL | Age: 53
End: 2023-05-30

## 2023-05-30 ENCOUNTER — OFFICE VISIT (OUTPATIENT)
Dept: OBGYN CLINIC | Facility: CLINIC | Age: 53
End: 2023-05-30

## 2023-05-30 VITALS
BODY MASS INDEX: 32.8 KG/M2 | HEIGHT: 67 IN | SYSTOLIC BLOOD PRESSURE: 156 MMHG | DIASTOLIC BLOOD PRESSURE: 90 MMHG | WEIGHT: 209 LBS | RESPIRATION RATE: 18 BRPM

## 2023-05-30 DIAGNOSIS — M24.662 ARTHROFIBROSIS OF KNEE JOINT, LEFT: ICD-10-CM

## 2023-05-30 DIAGNOSIS — Z96.652 HISTORY OF ARTHROPLASTY OF LEFT KNEE: Primary | ICD-10-CM

## 2023-05-30 RX ORDER — AMLODIPINE BESYLATE 5 MG/1
TABLET ORAL
COMMUNITY
Start: 2023-04-27

## 2023-05-30 RX ORDER — TRAMADOL HYDROCHLORIDE 50 MG/1
50 TABLET ORAL EVERY 6 HOURS PRN
Qty: 120 TABLET | Refills: 0 | Status: SHIPPED | OUTPATIENT
Start: 2023-05-30

## 2023-05-30 RX ORDER — LOSARTAN POTASSIUM AND HYDROCHLOROTHIAZIDE 25; 100 MG/1; MG/1
TABLET ORAL
COMMUNITY
Start: 2023-04-27

## 2023-05-30 RX ORDER — METOPROLOL SUCCINATE 50 MG/1
TABLET, EXTENDED RELEASE ORAL
COMMUNITY
Start: 2023-04-27

## 2023-05-30 RX ORDER — CHOLECALCIFEROL (VITAMIN D3) 1250 MCG
CAPSULE ORAL
COMMUNITY
Start: 2023-04-28

## 2023-05-30 RX ORDER — GABAPENTIN 300 MG/1
300 CAPSULE ORAL 3 TIMES DAILY
Qty: 90 CAPSULE | Refills: 0 | Status: SHIPPED | OUTPATIENT
Start: 2023-05-30

## 2023-05-30 NOTE — PROGRESS NOTES
"Assessment:  No diagnosis found  Plan:  Reviewed today's physical exam findings and x-ray findings with patient at time of visit  {Diagnostic Test:71959} of {Laterality:27096} {body part:63746} taken on *** (date) were reviewed and showed {diagnostic findings:46932}  Risks and benefits of conservative and operative treatments were discussed in detail with the patient by Dr Kenyetta Reyes The risks of *** including infection, bleeding, injury to nerves, injury to the vessels, excess scar tissue formation, risk of failure of the procedure, the possible need for further surgery, and potential risk of loss of limb and life  After weighing all the treatment options available, the patient has *** opted for surgical intervention and informed consent was obtained  {He/She:00167} is tentatively scheduled for ***  We will schedule the patient to be seen back postoperatively  {He/She:14169} can continue NSAIDs/Tylenol as needed for pain and soreness  {He/She:18216} will be seen for follow-up {Time:18::\"in 1 week\"} for re-evaluation and *** consideration for repeat x-rays, injections as necessary  Patient expresses understanding and is in agreement with this treatment plan  The patient was given the opportunity to ask questions or present concerns  Pre-op instructions  Medications:  Hold anticoagulation therapy 5-7 days prior to surgery date  Hold vitamins/minerals/supplements/ NSAIDs 7 days prior to surgery to decrease bleeding risk  *** Hold diabetic medications morning of surgery  *** Hold Ace/Arbs/CCB day of surgery  OK to take rest of your medications morning of surgery with small sip of water including pain medication  NPO night before surgery at midnight  Advised to discuss this with their prescribers as well  To do next visit:  No follow-ups on file  The above stated was discussed in layman's terms and the patient expressed understanding  All questions were answered to the patient's satisfaction         Scribe " Attestation    I,:   am acting as a scribe while in the presence of the attending physician :       I,:   personally performed the services described in this documentation    as scribed in my presence :             Subjective: Anthony Stephens is a 45 y o  female who presents today for {A / An:98684} {Encounter:83332} evaluation of {his/her:78124} {Laterality:45470} {body part:65071} due to {Symptom:33456}  Patient was last seen on *** at which time {he/she:99009} received *** (plan) to provide symptomatic relief  Today {he/she:25821} would like ***  ***Patients presents today using a {Device:57921} for ambulatory assistance  {He/She:22154} denies any recent bruising, numbness, paresthesias, weakness, or feelings of instability  {He/She:58019} denies any fevers, chills, dizziness, headaches, chest pain, shortness of breath, palpitations, abdominal pain, nausea, vomiting, diarrhea, lower extremity pain/swelling/edema  Review of systems negative unless otherwise specified in HPI  Review of Systems    Past Medical History:   Diagnosis Date   • Hypertension        Past Surgical History:   Procedure Laterality Date   • KNEE ARTHROPLASTY Left        No family history on file      Social History     Occupational History   • Not on file   Tobacco Use   • Smoking status: Never   • Smokeless tobacco: Never   Substance and Sexual Activity   • Alcohol use: Never   • Drug use: Not on file   • Sexual activity: Not on file         Current Outpatient Medications:   •  acetaminophen (TYLENOL) 500 mg tablet, Take 500 mg by mouth every 6 (six) hours as needed, Disp: , Rfl:   •  amLODIPine (NORVASC) 5 mg tablet, , Disp: , Rfl:   •  aspirin (ECOTRIN LOW STRENGTH) 81 mg EC tablet, Take 81 mg by mouth daily, Disp: , Rfl:   •  celecoxib (CeleBREX) 200 mg capsule, Take 200 mg by mouth 2 (two) times a day, Disp: , Rfl:   •  Cholecalciferol (Vitamin D3) 1 25 MG (41574 UT) CAPS, , Disp: , Rfl:   •  Diclofenac Sodium (VOLTAREN) 1 %, Apply 1 application topically, Disp: , Rfl:   •  losartan (COZAAR) 100 MG tablet, Take 1 tablet (100 mg total) by mouth daily, Disp: 90 tablet, Rfl: 1  •  metoprolol tartrate (LOPRESSOR) 50 mg tablet, Take 50 mg by mouth 2 (two) times a day, Disp: , Rfl:   •  omeprazole (PriLOSEC OTC) 20 MG tablet, Take 1 tablet (20 mg total) by mouth daily, Disp: 90 tablet, Rfl: 1  •  senna-docusate sodium (SENOKOT S) 8 6-50 mg per tablet, Take 1 tablet by mouth 2 (two) times a day, Disp: , Rfl:   •  sertraline (Zoloft) 50 mg tablet, Take 1 tablet (50 mg total) by mouth daily, Disp: 30 tablet, Rfl: 1  •  terbinafine (LamISIL) 250 mg tablet, , Disp: , Rfl:   •  traMADol (Ultram) 50 mg tablet, Take 1 tablet (50 mg total) by mouth every 6 (six) hours as needed for moderate pain, Disp: 30 tablet, Rfl: 0  •  acetaminophen (TYLENOL) 500 mg tablet, Take 2 tablets (1,000 mg total) by mouth every 8 (eight) hours as needed for mild pain (Patient not taking: Reported on 4/21/2023), Disp: 60 tablet, Rfl: 0  •  gabapentin (NEURONTIN) 100 mg capsule, , Disp: , Rfl:   •  gabapentin (Neurontin) 300 mg capsule, Take 1 capsule (300 mg total) by mouth 3 (three) times a day (Patient not taking: Reported on 5/30/2023), Disp: 90 capsule, Rfl: 0  •  lidocaine (LIDODERM) 5 %, Apply 1 patch topically over 12 hours every 24 hours Remove & Discard patch within 12 hours or as directed by MD (Patient not taking: Reported on 5/30/2023), Disp: 15 patch, Rfl: 0  •  losartan-hydrochlorothiazide (HYZAAR) 100-25 MG per tablet, , Disp: , Rfl:   •  meloxicam (MOBIC) 7 5 mg tablet, , Disp: , Rfl:   •  metoprolol succinate (TOPROL-XL) 50 mg 24 hr tablet, , Disp: , Rfl:   •  rosuvastatin (CRESTOR) 20 MG tablet, , Disp: , Rfl:     No Known Allergies       Vitals:    05/30/23 1008   BP: 156/90   Resp: 18       Objective:                    Ortho Exam    Diagnostics, reviewed and taken today if performed as documented:    None performed ***    The attending physician has "personally reviewed the pertinent films in PACS and interpretation is as follows:    {Diagnostic Test:36211} of {Laterality:44971} {body part:79937} taken on *** (date) were reviewed and showed {diagnostic findings:91723}  Procedures, if performed today:    Procedures    None performed ***    Portions of the record may have been created with voice recognition software  Occasional wrong word or \"sound a like\" substitutions may have occurred due to the inherent limitations of voice recognition software  Read the chart carefully and recognize, using context, where substitutions have occurred    "

## 2023-05-30 NOTE — PROGRESS NOTES
Orthopedic Surgery Office Note  Lizbeth Iqbal (41 y o  female)   : 1970   MRN: [id number]   Encounter Date: 2023  Dr Etienne Garzon DO, Orthopedic Surgeon  [facility]   Chief Complaint   Patient presents with   • Left Knee - Follow-up     CT Scan results  Assessment / Plan  Diagnoses and all orders for this visit:    History of arthroplasty of left knee  -     Ambulatory Referral to Pain Management; Future    Arthrofibrosis of knee joint, left    Other orders  -     amLODIPine (NORVASC) 5 mg tablet  -     Cholecalciferol (Vitamin D3) 1 25 MG (97558 UT) CAPS  -     losartan-hydrochlorothiazide (HYZAAR) 100-25 MG per tablet  -     metoprolol succinate (TOPROL-XL) 50 mg 24 hr tablet;  (Patient not taking: Reported on 2023)    Discussion:   • Reviewed physical exam and imaging with patient on today's visit  She is s/p TKA of her left knee  Recommended that the patient continue physical therapy to improve range of motion and strength of her hip, knee, and core  Continue use of Tramadol and Gabapentin to assist with pain and soreness  This was the correct pharmacy  The patient expresses understanding and is in agreement with today's treatment plan  • There were no radiographic evidence for the her continued pain, physical exam she has a stable knee from 5 degrees to 90 degrees with continuous pain throughout all range of motion but extensor mechanism is intact, valgus and varus stresses intact  No mechanical reason for this pain   -Will refer to pain management to consider genicular block to left lower extremity versus any other treatment modality to improve pain / soreness and allow the patient to complete physical therapy with maximum benefit / improved progressed - ROM and strength  -No mechanical reason for her continued pain  CAT scan demonstrated well fixed prosthesis  No significant effusion  She underwent a manipulation previously    We will attempt any other sort of nonsurgical options for her in order to get her discomfort under control before considering any sort of surgical treatment  Surgery:   • No surgery planned at this time    Plan:   · Ambulatory referral to pain management - consideration for genicular block  · Weightbearing activities as tolerated  · Continue outpatient physical therapy to improve ROM and strength of left lower extremity   · Continue use of tramadol and gabapentin to provide symptomatic relief  Return for after appointment with pain management  History of Present Illness: Eric Randolph is a 45 y o  female who presents with left knee pain  An interpretor was used to assist with today's visit  The patient is 6 months s/p total knee arthroplasty of her left knee performed by an outside provider  The patient had a manipulation of her left total knee under anesthesia performed on 02/22/2023  On today's presentation, the patient reports for follow-up of CT of left lower extremity  The patient reports no improvement in her symptoms  The patient has difficulty and worsening pain / stiffness with prolonged sedentary positions and weightbearing activities  The patient notes a hot sensation in her left left  She states that she has been compliant with formal outpatient physical therapy but reports no symptomatic relief  She complains of a lack of knee extension, which has caused an inability to ambulate freely  She reports using a cane as an assistive device  Denies any numbness or tingling in left lower extremity  Review of Systems  Constitutional: Negative for fatigue, fever or loss of appetite  HENT: Negative  Respiratory: Negative for shortness of breath, dyspnea  Cardiovascular: Negative for chest pain/tightness  Gastrointestinal: Negative for abdominal pain, N/V  Endocrine: Negative for cold/heat intolerance, unexplained weight loss/gain     Genitourinary: Negative for flank pain, dysuria  Musculoskeletal: "Positive for arthralgia   Skin: Negative for rash  Neurological:  Negative for numbness or tingling  Psychiatric/Behavioral: Negative for agitation  Physical Exam  /90 (BP Location: Left arm, Patient Position: Sitting, Cuff Size: Adult)   Resp 18   Ht 5' 7\" (1 702 m)   Wt 94 8 kg (209 lb)   BMI 32 73 kg/m²   Cons: Appears well  No apparent distress  Psych: Alert  Oriented x3  Mood and affect normal   Eyes: PERRLA, EOMI  Resp: Normal effort  No audible wheezing or stridor  CV: Extremities warm and well perfused  Skin: Warm  No visible lesions  Neuro: Normal muscle tone  Orthopedic Exam:   Left knee -   Patient ambulates with antalgic gait pattern  Uses No assistive device - states that she usually uses a cane  No anatomical deformity  Skin is warm and dry to touch with no signs of erythema, ecchymosis, or infection   Mild generalized soft tissue swelling or effusion noted  Palpation: TTP medial joint line  TTP posterior knee  ROM: (5° - 90°)   Strength 4/5 throughout  Flexor and extensor mechanisms are intact   Knee is stable to varus and valgus stress  - Lachman's  - Anterior Drawer, - Posterior Drawer  - Pivot shift  Calf compartments are soft and supple  - Elliott's sign  2+ DP and PT pulses with brisk capillary refill to the toes  Sural, saphenous, tibial, superficial, and deep peroneal motor and sensory distributions intact  Sensation light touch intact distally    Studies Reviewed  Study type: XRAY left knee(s)  Date: 3/21/23  I have read and reviewed radiology report and agree with the interpretation  My interpretation is as follows:  well-seated total knee prosthesis with maintained anatomical alignment and no signs of loosening  Study type: CT left lower extremity without contrast  Date: 05/19/2023  I have read and reviewed radiology report and agree with the interpretation  My interpretation is as follows: Status post total knee condylar arthroplasty as above   No acute " osseous abnormality  Small joint effusion with mild synovial thickening, nonspecific  Procedures  No procedures today  Medical, Surgical, Family, and Social History  The patient's medical history, family history, and social history, were reviewed and updated as appropriate  Past Medical History:   Diagnosis Date   • Hypertension      Past Surgical History:   Procedure Laterality Date   • KNEE ARTHROPLASTY Left      No family history on file    Social History     Occupational History   • Not on file   Tobacco Use   • Smoking status: Never   • Smokeless tobacco: Never   Substance and Sexual Activity   • Alcohol use: Never   • Drug use: Not on file   • Sexual activity: Not on file     No Known Allergies    Current Outpatient Medications:   •  acetaminophen (TYLENOL) 500 mg tablet, Take 500 mg by mouth every 6 (six) hours as needed, Disp: , Rfl:   •  amLODIPine (NORVASC) 5 mg tablet, , Disp: , Rfl:   •  aspirin (ECOTRIN LOW STRENGTH) 81 mg EC tablet, Take 81 mg by mouth daily, Disp: , Rfl:   •  celecoxib (CeleBREX) 200 mg capsule, Take 200 mg by mouth 2 (two) times a day, Disp: , Rfl:   •  Cholecalciferol (Vitamin D3) 1 25 MG (28517 UT) CAPS, , Disp: , Rfl:   •  Diclofenac Sodium (VOLTAREN) 1 %, Apply 1 application topically, Disp: , Rfl:   •  losartan (COZAAR) 100 MG tablet, Take 1 tablet (100 mg total) by mouth daily, Disp: 90 tablet, Rfl: 1  •  metoprolol tartrate (LOPRESSOR) 50 mg tablet, Take 50 mg by mouth 2 (two) times a day, Disp: , Rfl:   •  omeprazole (PriLOSEC OTC) 20 MG tablet, Take 1 tablet (20 mg total) by mouth daily, Disp: 90 tablet, Rfl: 1  •  senna-docusate sodium (SENOKOT S) 8 6-50 mg per tablet, Take 1 tablet by mouth 2 (two) times a day, Disp: , Rfl:   •  sertraline (Zoloft) 50 mg tablet, Take 1 tablet (50 mg total) by mouth daily, Disp: 30 tablet, Rfl: 1  •  terbinafine (LamISIL) 250 mg tablet, , Disp: , Rfl:   •  traMADol (Ultram) 50 mg tablet, Take 1 tablet (50 mg total) by mouth every 6 (six) hours as needed for moderate pain, Disp: 30 tablet, Rfl: 0  •  acetaminophen (TYLENOL) 500 mg tablet, Take 2 tablets (1,000 mg total) by mouth every 8 (eight) hours as needed for mild pain (Patient not taking: Reported on 4/21/2023), Disp: 60 tablet, Rfl: 0  •  gabapentin (NEURONTIN) 100 mg capsule, , Disp: , Rfl:   •  gabapentin (Neurontin) 300 mg capsule, Take 1 capsule (300 mg total) by mouth 3 (three) times a day (Patient not taking: Reported on 5/30/2023), Disp: 90 capsule, Rfl: 0  •  lidocaine (LIDODERM) 5 %, Apply 1 patch topically over 12 hours every 24 hours Remove & Discard patch within 12 hours or as directed by MD (Patient not taking: Reported on 5/30/2023), Disp: 15 patch, Rfl: 0  •  losartan-hydrochlorothiazide (HYZAAR) 100-25 MG per tablet, , Disp: , Rfl:   •  meloxicam (MOBIC) 7 5 mg tablet, , Disp: , Rfl:   •  metoprolol succinate (TOPROL-XL) 50 mg 24 hr tablet, , Disp: , Rfl:   •  rosuvastatin (CRESTOR) 20 MG tablet, , Disp: , Rfl:     30 minutes was spent in the coordination of care, reviewing of imaging and with the patient on the date of service    Scribe Attestation    I,:  Yan Rosado am acting as a scribe while in the presence of the attending physician :       I,:  Hawk Vega DO personally performed the services described in this documentation    as scribed in my presence :

## 2023-05-30 NOTE — TELEPHONE ENCOUNTER
Caller:  Alla Dougherty from [facility]      Doctor: Dr Britta Franz    Reason for call: asking for med records from 9/1/21-to present   I directed her to call Med Records

## 2023-05-30 NOTE — LETTER
May 30, 2023     Patient: Pauline Sarah  YOB: 1970  Date of Visit: 5/30/2023      To Whom it May Concern: Pauline Sarah is under my professional care  Betina Dillon was seen in my office on 5/30/2023  Betina Dillon has arthrofibrosis of the left knee  She will see pain management for further evaluation  The patient is currently out of work  If you have any questions or concerns, please don't hesitate to call           Sincerely,          Deborah Staples DO        CC: No Recipients

## 2023-06-06 NOTE — TELEPHONE ENCOUNTER
Caller: [facility]    Doctor: Janet Carballo    Reason for call: Called to find out status of medical records call was transferred

## 2023-06-14 ENCOUNTER — CONSULT (OUTPATIENT)
Dept: PAIN MEDICINE | Facility: CLINIC | Age: 53
End: 2023-06-14
Payer: COMMERCIAL

## 2023-06-14 ENCOUNTER — APPOINTMENT (OUTPATIENT)
Dept: RADIOLOGY | Facility: MEDICAL CENTER | Age: 53
End: 2023-06-14
Payer: COMMERCIAL

## 2023-06-14 VITALS
BODY MASS INDEX: 33.18 KG/M2 | SYSTOLIC BLOOD PRESSURE: 167 MMHG | HEART RATE: 61 BPM | WEIGHT: 211.4 LBS | DIASTOLIC BLOOD PRESSURE: 97 MMHG | HEIGHT: 67 IN | RESPIRATION RATE: 16 BRPM

## 2023-06-14 DIAGNOSIS — G89.4 CHRONIC PAIN SYNDROME: ICD-10-CM

## 2023-06-14 DIAGNOSIS — M47.816 LUMBAR SPONDYLOSIS: ICD-10-CM

## 2023-06-14 DIAGNOSIS — G89.29 CHRONIC PAIN OF RIGHT KNEE: ICD-10-CM

## 2023-06-14 DIAGNOSIS — M25.561 CHRONIC PAIN OF RIGHT KNEE: ICD-10-CM

## 2023-06-14 DIAGNOSIS — M54.16 LUMBAR RADICULOPATHY: Primary | ICD-10-CM

## 2023-06-14 DIAGNOSIS — M51.36 LUMBAR DEGENERATIVE DISC DISEASE: ICD-10-CM

## 2023-06-14 DIAGNOSIS — G62.9 NEUROPATHY: ICD-10-CM

## 2023-06-14 PROBLEM — M51.369 LUMBAR DEGENERATIVE DISC DISEASE: Status: ACTIVE | Noted: 2023-06-14

## 2023-06-14 PROCEDURE — 99204 OFFICE O/P NEW MOD 45 MIN: CPT | Performed by: ANESTHESIOLOGY

## 2023-06-14 PROCEDURE — 73562 X-RAY EXAM OF KNEE 3: CPT

## 2023-06-14 RX ORDER — DULOXETIN HYDROCHLORIDE 30 MG/1
30 CAPSULE, DELAYED RELEASE ORAL DAILY
Qty: 30 CAPSULE | Refills: 1 | Status: SHIPPED | OUTPATIENT
Start: 2023-06-14 | End: 2023-07-14

## 2023-06-14 NOTE — PROGRESS NOTES
Assessment:  1  Lumbar radiculopathy    2  Neuropathy    3  Lumbar spondylosis    4  Lumbar degenerative disc disease    5  Chronic pain of right knee    6  Chronic pain syndrome        Plan:  Patient is a 40-year-old female complains of low back pain, bilateral leg pain left worse than right, bilateral left knee pain status post total knee arthroplasty presents to office for initial consultation  Patient reports significant pain especially at the side of the knee and below the knee on the left lower extremity into the foot and ankle  Patient reports the pain started after surgical intervention and has gotten worse since  Patient reports also pain in the posterior compartment of the left knee  Patient continues to have significant pain in the knee both posterior and anterior with cramping and swelling below the knee  Patient reports numbness tingling burning cramping sensation and swelling in the left leg  Patient reports significant pain especially on top of the foot and at the ankle  Patient does have a history of low back pain which at this time has not been assessed  We will need further diagnostic imaging  There is concern because patient is exhibiting some significant weakness in the left leg requiring a cane for gait assistance  Due to this we feel is emergent to get an MRI prior to any physical therapy of the low back  1   Patient will continue gabapentin at current dose  2   We will add Cymbalta 30 mg p o  nightly at nighttime for lumbar radicular symptoms and neuropathy  3  We will order an x-ray of the lumbar spine to better assess the degenerative changes according patient current presentation  4   We will order an MRI of the lumbar spine to better assess the discogenic pathology or correlate patient's lower extremity neuropathy which appears to be in the L4 and L5 nerve root distribution left lower extremity  5   We will trial diclofenac 50 mg p o  daily for arthritic right knee pain  6  We will obtain an x-ray of the right knee  7  We will follow-up in 1 month to review images      History of Present Illness: The patient is a 45 y o  female who presents for consultation in regards to Knee Pain (Left knee pain since surgery)  Symptoms have been present for 7 months  Symptoms began without any precipitating injury or trauma  Pain is reported to be 8 on the numeric rating scale  Symptoms are felt constantly and worst in the no typical pattern  Symptoms are characterized as shooting, sharp, cutting, tingling, pressure-like and throbbing  Symptoms are associated with left leg weakness  Aggravating factors include standing, bending, leaning forward, leaning bckward, walking and exercise  Relieving factors include nothing  No change in symptoms with kneeling, lying down, sitting, turning the head, relaxation, coughing/sneezing and bowel movements  Treatments that have been helpful include nothing  surgery , physical therapy and home exercise have provided no relief  Medications to relieve symptoms include tramadol, Celebrex  Review of Systems:    Review of Systems   Constitutional: Positive for chills and unexpected weight change  Cardiovascular: Positive for leg swelling  Gastrointestinal: Positive for nausea  Musculoskeletal: Positive for joint swelling and myalgias  Joint pain   Neurological: Positive for headaches  Psychiatric/Behavioral:        Anxiety  depression   All other systems reviewed and are negative  Past Medical History:   Diagnosis Date   • Hypertension        Past Surgical History:   Procedure Laterality Date   • KNEE ARTHROPLASTY Left        History reviewed  No pertinent family history      Social History     Occupational History   • Not on file   Tobacco Use   • Smoking status: Never   • Smokeless tobacco: Never   Substance and Sexual Activity   • Alcohol use: Never   • Drug use: Not on file   • Sexual activity: Not on file         Current Outpatient Medications:   •  acetaminophen (TYLENOL) 500 mg tablet, Take 500 mg by mouth every 6 (six) hours as needed, Disp: , Rfl:   •  amLODIPine (NORVASC) 5 mg tablet, , Disp: , Rfl:   •  Cholecalciferol (Vitamin D3) 1 25 MG (73847 UT) CAPS, , Disp: , Rfl:   •  Diclofenac Sodium (VOLTAREN) 1 %, Apply 1 application topically, Disp: , Rfl:   •  gabapentin (Neurontin) 300 mg capsule, Take 1 capsule (300 mg total) by mouth 3 (three) times a day, Disp: 90 capsule, Rfl: 0  •  losartan-hydrochlorothiazide (HYZAAR) 100-25 MG per tablet, , Disp: , Rfl:   •  metoprolol tartrate (LOPRESSOR) 50 mg tablet, Take 50 mg by mouth 2 (two) times a day, Disp: , Rfl:   •  omeprazole (PriLOSEC OTC) 20 MG tablet, Take 1 tablet (20 mg total) by mouth daily, Disp: 90 tablet, Rfl: 1  •  terbinafine (LamISIL) 250 mg tablet, , Disp: , Rfl:   •  acetaminophen (TYLENOL) 500 mg tablet, Take 2 tablets (1,000 mg total) by mouth every 8 (eight) hours as needed for mild pain (Patient not taking: Reported on 4/21/2023), Disp: 60 tablet, Rfl: 0  •  aspirin (ECOTRIN LOW STRENGTH) 81 mg EC tablet, Take 81 mg by mouth daily (Patient not taking: Reported on 6/14/2023), Disp: , Rfl:   •  celecoxib (CeleBREX) 200 mg capsule, Take 200 mg by mouth 2 (two) times a day (Patient not taking: Reported on 6/14/2023), Disp: , Rfl:   •  gabapentin (NEURONTIN) 100 mg capsule, , Disp: , Rfl:   •  lidocaine (LIDODERM) 5 %, Apply 1 patch topically over 12 hours every 24 hours Remove & Discard patch within 12 hours or as directed by MD (Patient not taking: Reported on 5/30/2023), Disp: 15 patch, Rfl: 0  •  losartan (COZAAR) 100 MG tablet, Take 1 tablet (100 mg total) by mouth daily (Patient not taking: Reported on 6/14/2023), Disp: 90 tablet, Rfl: 1  •  meloxicam (MOBIC) 7 5 mg tablet, , Disp: , Rfl:   •  metoprolol succinate (TOPROL-XL) 50 mg 24 hr tablet, , Disp: , Rfl:   •  rosuvastatin (CRESTOR) 20 MG tablet, , Disp: , Rfl:   •  senna-docusate sodium (SENOKOT S) "8 6-50 mg per tablet, Take 1 tablet by mouth 2 (two) times a day (Patient not taking: Reported on 6/14/2023), Disp: , Rfl:   •  sertraline (Zoloft) 50 mg tablet, Take 1 tablet (50 mg total) by mouth daily (Patient not taking: Reported on 6/14/2023), Disp: 30 tablet, Rfl: 1  •  traMADol (Ultram) 50 mg tablet, Take 1 tablet (50 mg total) by mouth every 6 (six) hours as needed for moderate pain (Patient not taking: Reported on 6/14/2023), Disp: 30 tablet, Rfl: 0  •  traMADol (Ultram) 50 mg tablet, Take 1 tablet (50 mg total) by mouth every 6 (six) hours as needed for moderate pain (Patient not taking: Reported on 6/14/2023), Disp: 120 tablet, Rfl: 0    No Known Allergies    Physical Exam:    /97   Pulse 61   Resp 16   Ht 5' 7\" (1 702 m)   Wt 95 9 kg (211 lb 6 4 oz)   BMI 33 11 kg/m²     Constitutional: normal, well developed, well nourished, alert, in no distress and non-toxic and no overt pain behavior  and obese  Eyes: anicteric  HEENT: grossly intact  Neck: supple, symmetric, trachea midline and no masses   Pulmonary:even and unlabored  Cardiovascular:No edema or pitting edema present  Skin:Normal without rashes or lesions and well hydrated  Psychiatric:Mood and affect appropriate  Neurologic:Cranial Nerves II-XII grossly intact  Musculoskeletal:antalgic     Lumbar/Sacral Spine examination demonstrates  Full range of motion lumbar spine with pain upon: flexion, lateral rotation to the left/right, and bending to the left/right  Bilateral lumbar paraspinals tender to palpation  Muscle spasms noted in the lumbar area bilaterally  3/5 left lower extremity strength in all tibialis anterior, gastrocnemius  Positive seated straight leg raise for bilateral lower extremities  Sensitivity to light touch intact bilateral lower extremities  2+ reflexes in the patella and Achilles    No ankle clonus    Imaging    Study Result    Narrative & Impression   CT left knee without IV contrast     INDICATION: O05 [id number]: " Presence of left artificial knee joint      COMPARISON: 3/21/2023      TECHNIQUE: CT examination of the above was performed  This examination, like all CT scans performed in the [facility], was performed utilizing techniques to minimize radiation dose exposure, including the use of iterative reconstruction   and automated exposure control software  Multiplanar 2D reformatted images were created from the source data      Rad dose  979 53 mGy-cm     FINDINGS:     OSSEOUS STRUCTURES: There is a total knee condylar arthroplasty in place  The components appear well seated and articulate appropriately with each other  No surrounding lucency or periprosthetic fracture identified      VISUALIZED MUSCULATURE:  Unremarkable      SOFT TISSUES: There is mild anterior subcutaneous edema     OTHER PERTINENT FINDINGS: There is a small joint effusion with mild synovial thickening, nonspecific      IMPRESSION:     Status post total knee condylar arthroplasty as above  No acute osseous abnormality  Small joint effusion with mild synovial thickening, nonspecific               Workstation performed: [id number]         No orders to display       No orders of the defined types were placed in this encounter

## 2023-06-14 NOTE — PATIENT INSTRUCTIONS
Core Strengthening Exercises   WHAT YOU NEED TO KNOW:   What do I need to know about core strengthening exercises? Your core includes the muscles of your lower back, hip, pelvis, and abdomen  Core strengthening exercises help heal and strengthen these muscles  This helps prevent another injury, and keeps your pelvis, spine, and hips in the correct position  What do I need to know about exercise safety? Talk to your healthcare provider before you start an exercise program  A physical therapist can teach you how to do core strengthening exercises safely  Do the exercises on a mat or firm surface  A firm surface will support your spine and prevent low back pain  Do not do these exercises on a bed  Move slowly and smoothly  Avoid fast or jerky motions  Stop if you feel pain  You may feel some discomfort at first, but you should not feel pain  Tell your provider or physical therapist if you have pain while you exercise  Regular exercise will help decrease your discomfort over time  Breathe normally during core exercises  Do not hold your breath  This may cause an increase in blood pressure and prevent muscle strengthening  Your healthcare provider will tell you when to inhale and exhale during the exercise  Begin all of your exercises with abdominal bracing  Abdominal bracing helps warm up your core muscles  You can also practice abdominal bracing throughout the day  Lie on your back with your knees bent and feet flat on the floor  Place your arms in a relaxed position beside your body  Tighten your abdominal muscles  Pull your belly button in and up toward your spine  Hold for 5 seconds  Relax your muscles  Repeat 10 times  How do I perform core strengthening exercises? Your healthcare provider will tell you how often to do these exercises  The provider will also tell you how many repetitions of each exercise you should do  Hold each exercise for 5 seconds or as directed   As you get stronger, increase your hold to 10 to 15 seconds  You can do some of these exercises on a stability ball, or with a weight  Ask your healthcare provider how to use a stability ball or weight for these exercises:  Bridging:  Lie on your back with your knees bent and feet flat on the floor  Rest your arms at your side  Tighten your buttocks, and then lift your hips 1 inch off the floor  Hold for 5 seconds  When you can do this exercise without pain for 10 seconds, increase the distance you lift your hips  A good goal is to be able to lift your hips so that your shoulders, hips, and knees are in a straight line  Dead bug:  Lie on your back with your knees bent and feet flat on the floor  Place your arms in a relaxed position beside your body  Begin with abdominal bracing  Next, raise one leg, keeping your knee bent  Hold for 5 seconds  Repeat with the other leg  When you can do this exercise without pain for 10 to 15 seconds, you may raise one straight leg and hold  Repeat with the other leg  Quadruped:  Place your hands and knees on the floor  Keep your wrists directly below your shoulders and your knees directly below your hips  Pull your belly button in toward your spine  Do not flatten or arch your back  Tighten your abdominal muscles below your belly button  Hold for 5 seconds  When you can do this exercise without pain for 10 to 15 seconds, you may extend one arm and hold  Repeat on the other side  Side bridge exercises:      Standing side bridge:  Stand next to a wall and extend one arm toward the wall  Place your palm flat on the wall with your fingers pointing upward  Begin with abdominal bracing  Next, without moving your feet, slowly bend your arm to 90 degrees  Hold for 5 seconds  Repeat on the other side  When you can do this exercise without pain for 10 to 15 seconds, you may do the bent leg side bridge on the floor           Bent leg side bridge:  Lie on one side with your legs, hips, and shoulders in a straight line  Prop yourself up onto your forearm so your elbow is directly below your shoulder  Bend your knees back to 90 degrees  Begin with abdominal bracing  Next, lift your hips and balance yourself on your forearm and knees  Hold for 5 seconds  Repeat on the other side  When you can do this exercise without pain for 10 to 15 seconds, you may do the straight leg side bridge on the floor  Straight leg side bridge:  Lie on one side with your legs, hips, and shoulders in a straight line  Prop yourself up onto your forearm so your elbow is directly below your shoulder  Begin with abdominal bracing  Lift your hips off the floor and balance yourself on your forearm and the outside of your flexed foot  Do not let your ankle bend sideways  Hold for 5 seconds  Repeat on the other side  When you can do this exercise without pain for 10 to 15 seconds, ask your healthcare provider for more advanced exercises  Superman:  Lie on your stomach  Extend your arms forward on the floor  Tighten your abdominal muscles and lift your right hand and left leg off the floor  Hold this position  Slowly return to the starting position  Tighten your abdominal muscles and lift your left hand and right leg off the floor  Hold this position  Slowly return to the starting position  Clam:  Lie on your side with your knees bent  Put your bottom arm under your head to keep your neck in line  Put your top hand on your hip to keep your pelvis from moving  Put your heels together, and keep them together during this exercise  Slowly raise your top knee toward the ceiling  Then lower your leg so your knees are together  Repeat this exercise 10 times  Then switch sides and do the exercise 10 times with the other leg  Curl up:  Lie on your back with your knees bent and feet flat on the floor  Place your hands, palms down, underneath your lower back   Next, with your elbows on the floor, lift your shoulders and chest 2 to 3 inches off the floor  Keep your head in line with your shoulders  Hold this position  Slowly return to the starting position  Straight leg raises:  Lie on your back with one leg straight  Bend the other knee and place your foot flat on the floor  Tighten your abdominal muscles  Keep your leg straight and slowly lift it straight up 6 to 12 inches off the floor  Hold this position  Lower your leg slowly  Do as many repetitions as directed on this side  Repeat with the other leg  Plank:  Lie on your stomach  Bend your elbows and place your forearms flat on the floor  Lift your chest, stomach, and knees off the floor  Make sure your elbows are below your shoulders  Your body should be in a straight line  Do not let your hips or lower back sink to the ground  Squeeze your abdominal muscles together and hold for 15 seconds  To make this exercise harder, hold for 30 seconds or lift 1 leg at a time  Bicycles:  Lie on your back  Bend both knees and bring them toward your chest  Your calves should be parallel to the floor  Place the palms of your hands on the back of your head  Straighten your right leg and keep it lifted 2 inches off the floor  Raise your head and shoulders off the floor and twist towards your left  Keep your head and shoulders lifted  Bend your right knee while you straighten your left leg  Keep your left leg 2 inches off the floor  Twist your head and chest towards the left leg  Continue to straighten 1 leg at a time and twist        When should I call my doctor or physical therapist?   You have sharp or worsening pain during exercise or at rest     You have questions or concerns about your condition, care, or exercise program     CARE AGREEMENT:   You have the right to help plan your care  Learn about your health condition and how it may be treated  Discuss treatment options with your healthcare providers to decide what care you want to receive   You always have the right to refuse treatment  The above information is an  only  It is not intended as medical advice for individual conditions or treatments  Talk to your doctor, nurse or pharmacist before following any medical regimen to see if it is safe and effective for you  © Copyright Tanya Nevarez 2022 Information is for End User's use only and may not be sold, redistributed or otherwise used for commercial purposes

## 2023-06-26 ENCOUNTER — TELEPHONE (OUTPATIENT)
Dept: PAIN MEDICINE | Facility: CLINIC | Age: 53
End: 2023-06-26

## 2023-06-26 NOTE — TELEPHONE ENCOUNTER
Caller: malachi Negrete    Doctor: Juan F Pablo     Reason for call: patient requesting Xray of Left knee son speaks [place] Deal and he is with his mom     Call back#: [phone]

## 2023-06-26 NOTE — TELEPHONE ENCOUNTER
Spoke to Pt son Brittny Colón with Pt verbal permission regarding continued left knee pain & weakness  Brittny Colón states the Pt would like an order placed for an x-ray of the left knee  Brittny Colón states the Pt is concerned that her left knee is not getting better since her PRO: Left TKR on 11/10/22 at [address] [place] [address] 6/14 with RM  (Right knee x-ray completed 6/14 & Left knee x-ray not ordered at that time)  Pt also had a PRO: Left knee manipulation on 2/22 at [facility] s/p Left TKR  NOV 8/1 with BK      Please advise-

## 2023-06-27 DIAGNOSIS — M25.562 CHRONIC PAIN OF LEFT KNEE: Primary | ICD-10-CM

## 2023-06-27 DIAGNOSIS — G89.29 CHRONIC PAIN OF LEFT KNEE: Primary | ICD-10-CM

## 2023-06-27 NOTE — TELEPHONE ENCOUNTER
Spoke to Pts son Apurva Dhaliwal with verbal permission from Pt  Inés Rees of the same  Apurva Dhaliwal verbalized understanding

## 2023-06-28 ENCOUNTER — HOSPITAL ENCOUNTER (OUTPATIENT)
Dept: MRI IMAGING | Facility: HOSPITAL | Age: 53
Discharge: HOME | End: 2023-06-28
Attending: ANESTHESIOLOGY
Payer: COMMERCIAL

## 2023-06-28 DIAGNOSIS — G62.9 NEUROPATHY: ICD-10-CM

## 2023-06-28 DIAGNOSIS — M47.816 LUMBAR SPONDYLOSIS: ICD-10-CM

## 2023-06-28 DIAGNOSIS — M51.36 LUMBAR DEGENERATIVE DISC DISEASE: ICD-10-CM

## 2023-06-28 DIAGNOSIS — M54.16 LUMBAR RADICULOPATHY: ICD-10-CM

## 2023-06-28 PROCEDURE — G1004 CDSM NDSC: HCPCS

## 2023-06-28 PROCEDURE — 72148 MRI LUMBAR SPINE W/O DYE: CPT

## 2023-08-01 ENCOUNTER — OFFICE VISIT (OUTPATIENT)
Dept: PAIN MEDICINE | Facility: CLINIC | Age: 53
End: 2023-08-01
Payer: COMMERCIAL

## 2023-08-01 VITALS
BODY MASS INDEX: 32.36 KG/M2 | SYSTOLIC BLOOD PRESSURE: 157 MMHG | DIASTOLIC BLOOD PRESSURE: 98 MMHG | HEIGHT: 67 IN | WEIGHT: 206.2 LBS | HEART RATE: 62 BPM | RESPIRATION RATE: 16 BRPM

## 2023-08-01 DIAGNOSIS — M25.562 CHRONIC PAIN OF LEFT KNEE: ICD-10-CM

## 2023-08-01 DIAGNOSIS — G89.4 CHRONIC PAIN SYNDROME: Primary | ICD-10-CM

## 2023-08-01 DIAGNOSIS — M51.36 LUMBAR DEGENERATIVE DISC DISEASE: ICD-10-CM

## 2023-08-01 DIAGNOSIS — M47.816 LUMBAR SPONDYLOSIS: ICD-10-CM

## 2023-08-01 DIAGNOSIS — G89.29 CHRONIC PAIN OF LEFT KNEE: ICD-10-CM

## 2023-08-01 DIAGNOSIS — Z96.652 STATUS POST LEFT KNEE REPLACEMENT: ICD-10-CM

## 2023-08-01 PROCEDURE — 99214 OFFICE O/P EST MOD 30 MIN: CPT | Performed by: NURSE PRACTITIONER

## 2023-08-01 RX ORDER — METHOCARBAMOL 500 MG/1
TABLET, FILM COATED ORAL
COMMUNITY
Start: 2023-07-07

## 2023-08-01 NOTE — PROGRESS NOTES
Assessment:  1. Chronic pain syndrome    2. Chronic pain of left knee    3. Status post left knee replacement    4. Lumbar degenerative disc disease    5. Lumbar spondylosis        Plan:  While the patient was in the office today, I did have a thorough conversation regarding their chronic pain syndrome, medication management, and treatment plan options. Is being seen for follow-up visit. She was initially seen here for consultation on 6/14/2023. X-ray of the right and left knee  knee and lumbar spine was ordered. Patient only had an x-ray of her right knee. X-ray of the right knee was fairly unremarkable. MRI of the lumbar spine was ordered. MRI reveals mild multilevel degenerative changes there is no high-grade foraminal stenosis. There is moderate right foraminal narrowing at L3-4. There is a right-sided disc protrusion at L3-4 contacting the exiting right L3 nerve root. MRI results were reviewed with patient during today's visit. Patient's biggest complaint is continued and persistent left knee pain. She underwent a left total knee replacement in November 2022 at [facility]. Unfortunately, she states that her pain never really improved. It actually seems worse now than it was before surgery. She did seek a second opinion at [facility] with Dr. Eusebio Garcia who referred patient here for possible genicular nerve blocks. Patient has attended physical therapy and underwent manipulation under anesthesia without significant improvement. We will schedule patient for left genicular nerve block in the near future. Will consider genicular radiofrequency ablation if she obtains good, but short-term relief. Complete risks and benefits including bleeding, infection, tissue reaction, nerve injury and allergic reaction were discussed. The approach was demonstrated using models and literature was provided. Verbal and written consent was obtained. History of Present Illness:   The patient is a 45 y.o. female who presents for a follow up office visit in regards to Leg Pain and Back Pain. The patient’s current symptoms include plaints of left knee pain. Current pain level is a 7-8/10. Patient states that she is very limited in every aspect of her life due to pain. Quality pain is described as sharp, shooting, throbbing. Current pain medications includes: Diclofenac 50 mg twice daily. I have personally reviewed and/or updated the patient's past medical history, past surgical history, family history, social history, current medications, allergies, and vital signs today. Review of Systems  Review of Systems   Musculoskeletal: Positive for gait problem. Left knee pain  Left knee swelling    All other systems reviewed and are negative. Past Medical History:   Diagnosis Date   • Anxiety    • Depression    • GERD (gastroesophageal reflux disease)    • Hypertension        Past Surgical History:   Procedure Laterality Date   • KNEE ARTHROPLASTY Left        History reviewed. No pertinent family history.     Social History     Occupational History   • Not on file   Tobacco Use   • Smoking status: Never   • Smokeless tobacco: Never   Vaping Use   • Vaping Use: Never used   Substance and Sexual Activity   • Alcohol use: Never   • Drug use: Never   • Sexual activity: Not Currently         Current Outpatient Medications:   •  acetaminophen (TYLENOL) 500 mg tablet, Take 500 mg by mouth every 6 (six) hours as needed, Disp: , Rfl:   •  amLODIPine (NORVASC) 5 mg tablet, , Disp: , Rfl:   •  Cholecalciferol (Vitamin D3) 1.25 MG (55695 UT) CAPS, , Disp: , Rfl:   •  Diclofenac Sodium (VOLTAREN) 1 %, Apply 1 application topically, Disp: , Rfl:   •  diclofenac sodium (VOLTAREN) 50 mg EC tablet, Take 1 tablet (50 mg total) by mouth 2 (two) times a day, Disp: 60 tablet, Rfl: 1  •  DULoxetine (CYMBALTA) 30 mg delayed release capsule, Take 1 capsule (30 mg total) by mouth daily, Disp: 30 capsule, Rfl: 1  •  gabapentin (Neurontin) 300 mg capsule, Take 1 capsule (300 mg total) by mouth 3 (three) times a day, Disp: 90 capsule, Rfl: 0  •  losartan-hydrochlorothiazide (HYZAAR) 100-25 MG per tablet, , Disp: , Rfl:   •  methocarbamol (ROBAXIN) 500 mg tablet, , Disp: , Rfl:   •  metoprolol tartrate (LOPRESSOR) 50 mg tablet, Take 50 mg by mouth 2 (two) times a day, Disp: , Rfl:   •  omeprazole (PriLOSEC OTC) 20 MG tablet, Take 1 tablet (20 mg total) by mouth daily, Disp: 90 tablet, Rfl: 1  •  terbinafine (LamISIL) 250 mg tablet, , Disp: , Rfl:   •  losartan (COZAAR) 100 MG tablet, Take 1 tablet (100 mg total) by mouth daily (Patient not taking: Reported on 6/14/2023), Disp: 90 tablet, Rfl: 1  •  metoprolol succinate (TOPROL-XL) 50 mg 24 hr tablet, , Disp: , Rfl:   •  rosuvastatin (CRESTOR) 20 MG tablet, , Disp: , Rfl:   •  senna-docusate sodium (SENOKOT S) 8.6-50 mg per tablet, Take 1 tablet by mouth 2 (two) times a day (Patient not taking: Reported on 6/14/2023), Disp: , Rfl:   •  traMADol (Ultram) 50 mg tablet, Take 1 tablet (50 mg total) by mouth every 6 (six) hours as needed for moderate pain (Patient not taking: Reported on 6/14/2023), Disp: 30 tablet, Rfl: 0  •  traMADol (Ultram) 50 mg tablet, Take 1 tablet (50 mg total) by mouth every 6 (six) hours as needed for moderate pain (Patient not taking: Reported on 6/14/2023), Disp: 120 tablet, Rfl: 0    No Known Allergies    Physical Exam:    /98   Pulse 62   Resp 16   Ht 5' 7" (1.702 m)   Wt 93.5 kg (206 lb 3.2 oz)   BMI 32.30 kg/m²     Constitutional:normal, well developed, well nourished, alert, in no distress and non-toxic and no overt pain behavior.   Eyes:anicteric  HEENT:grossly intact  Neck:supple, symmetric, trachea midline and no masses   Pulmonary:even and unlabored  Cardiovascular:No edema or pitting edema present  Skin:Normal without rashes or lesions and well hydrated  Psychiatric:Mood and affect appropriate  Neurologic:Cranial Nerves II-XII grossly intact  Musculoskeletal:antalgic    Imaging    Study Result    Narrative & Impression   CT left knee without IV contrast     INDICATION: I71.228: Presence of left artificial knee joint.     COMPARISON: 3/21/2023.     TECHNIQUE: CT examination of the above was performed. This examination, like all CT scans performed in the [facility], was performed utilizing techniques to minimize radiation dose exposure, including the use of iterative reconstruction   and automated exposure control software. Multiplanar 2D reformatted images were created from the source data.     Rad dose  979.53 mGy-cm     FINDINGS:     OSSEOUS STRUCTURES: There is a total knee condylar arthroplasty in place. The components appear well seated and articulate appropriately with each other. No surrounding lucency or periprosthetic fracture identified.     VISUALIZED MUSCULATURE:  Unremarkable.     SOFT TISSUES: There is mild anterior subcutaneous edema     OTHER PERTINENT FINDINGS: There is a small joint effusion with mild synovial thickening, nonspecific.     IMPRESSION:     Status post total knee condylar arthroplasty as above. No acute osseous abnormality. Small joint effusion with mild synovial thickening, nonspecific.              Study Result    Narrative & Impression   MRI LUMBAR SPINE WITHOUT CONTRAST     INDICATION: M54.16: Radiculopathy, lumbar region  G62.9: Polyneuropathy, unspecified  M47.816: Spondylosis without myelopathy or radiculopathy, lumbar region  M51.36: Other intervertebral disc degeneration, lumbar region.     COMPARISON:  None.     TECHNIQUE:  Multiplanar, multisequence imaging of the lumbar spine was performed. .        IMAGE QUALITY:  Diagnostic     FINDINGS:     VERTEBRAL BODIES:  There are 5 lumbar type vertebral bodies. There is mild levoscoliosis. Grade 1 anterolisthesis at L5-S1. Mild retrolisthesis at L2-3.   There are fat-containing vertebral body lesions in multiple level consistent with benign hemangioma. There 1 cm L3 vertebral body lesion with minimal mainly peripheral fat density likely representing atypical (fat poor) hemangioma.     Modic type II endplate degenerative signal at L5-S1.     SACRUM:  Normal signal within the sacrum. No evidence of insufficiency or stress fracture.     DISTAL CORD AND CONUS:  Normal size and signal within the distal cord and conus.     PARASPINAL SOFT TISSUES:  Paraspinal soft tissues are unremarkable.     LOWER THORACIC DISC SPACES:  Normal disc height and signal.  No disc herniation, canal stenosis or foraminal narrowing.     LUMBAR DISC SPACES:     L1-L2: Minimal disc bulge. Moderate facet arthropathy. No significant canal or foraminal stenosis.     L2-L3: Mild disc bulge. Mild to moderate facet arthropathy. Mild canal narrowing. Mild bilateral foraminal narrowing.     L3-L4: Disc bulge. There is right foraminal/extraforaminal disc protrusion. Marginal endplate osteophytes. Moderate facet arthropathy. Mild canal narrowing. Moderate right and mild left foraminal narrowing. Right foraminal/extraforaminal herniated disc   abuts undersurface of the exiting right L3 nerve root.     L4-L5: Minimal disc bulge. Small left foraminal disc protrusion. Moderate facet arthropathy. Minor canal narrowing. Mild bilateral foraminal narrowing.     L5-S1: Disc bulge. Moderate facet arthropathy. Minimal canal narrowing. Mild bilateral foraminal stenosis.     OTHER FINDINGS:  None.     IMPRESSION:     1. Mild multilevel degenerative canal narrowing as detailed. 2.  No high-grade foraminal stenosis. Moderate right foraminal narrowing at L3-4.  3.  Right foraminal/extraforaminal disc protrusion at level L3-4 contacts undersurface of exiting right L3 nerve root.        Workstation performed: [id number]           No orders to display       No orders of the defined types were placed in this encounter.

## 2023-08-01 NOTE — H&P (VIEW-ONLY)
Assessment:  1. Chronic pain syndrome    2. Chronic pain of left knee    3. Status post left knee replacement    4. Lumbar degenerative disc disease    5. Lumbar spondylosis        Plan:  While the patient was in the office today, I did have a thorough conversation regarding their chronic pain syndrome, medication management, and treatment plan options. Is being seen for follow-up visit. She was initially seen here for consultation on 6/14/2023. X-ray of the right and left knee  knee and lumbar spine was ordered. Patient only had an x-ray of her right knee. X-ray of the right knee was fairly unremarkable. MRI of the lumbar spine was ordered. MRI reveals mild multilevel degenerative changes there is no high-grade foraminal stenosis. There is moderate right foraminal narrowing at L3-4. There is a right-sided disc protrusion at L3-4 contacting the exiting right L3 nerve root. MRI results were reviewed with patient during today's visit. Patient's biggest complaint is continued and persistent left knee pain. She underwent a left total knee replacement in November 2022 at [facility]. Unfortunately, she states that her pain never really improved. It actually seems worse now than it was before surgery. She did seek a second opinion at [facility] with Dr. Darcie Camacho who referred patient here for possible genicular nerve blocks. Patient has attended physical therapy and underwent manipulation under anesthesia without significant improvement. We will schedule patient for left genicular nerve block in the near future. Will consider genicular radiofrequency ablation if she obtains good, but short-term relief. Complete risks and benefits including bleeding, infection, tissue reaction, nerve injury and allergic reaction were discussed. The approach was demonstrated using models and literature was provided. Verbal and written consent was obtained. History of Present Illness:   The patient is a 45 y.o. female who presents for a follow up office visit in regards to Leg Pain and Back Pain. The patient’s current symptoms include plaints of left knee pain. Current pain level is a 7-8/10. Patient states that she is very limited in every aspect of her life due to pain. Quality pain is described as sharp, shooting, throbbing. Current pain medications includes: Diclofenac 50 mg twice daily. I have personally reviewed and/or updated the patient's past medical history, past surgical history, family history, social history, current medications, allergies, and vital signs today. Review of Systems  Review of Systems   Musculoskeletal: Positive for gait problem. Left knee pain  Left knee swelling    All other systems reviewed and are negative. Past Medical History:   Diagnosis Date   • Anxiety    • Depression    • GERD (gastroesophageal reflux disease)    • Hypertension        Past Surgical History:   Procedure Laterality Date   • KNEE ARTHROPLASTY Left        History reviewed. No pertinent family history.     Social History     Occupational History   • Not on file   Tobacco Use   • Smoking status: Never   • Smokeless tobacco: Never   Vaping Use   • Vaping Use: Never used   Substance and Sexual Activity   • Alcohol use: Never   • Drug use: Never   • Sexual activity: Not Currently         Current Outpatient Medications:   •  acetaminophen (TYLENOL) 500 mg tablet, Take 500 mg by mouth every 6 (six) hours as needed, Disp: , Rfl:   •  amLODIPine (NORVASC) 5 mg tablet, , Disp: , Rfl:   •  Cholecalciferol (Vitamin D3) 1.25 MG (57497 UT) CAPS, , Disp: , Rfl:   •  Diclofenac Sodium (VOLTAREN) 1 %, Apply 1 application topically, Disp: , Rfl:   •  diclofenac sodium (VOLTAREN) 50 mg EC tablet, Take 1 tablet (50 mg total) by mouth 2 (two) times a day, Disp: 60 tablet, Rfl: 1  •  DULoxetine (CYMBALTA) 30 mg delayed release capsule, Take 1 capsule (30 mg total) by mouth daily, Disp: 30 capsule, Rfl: 1  •  gabapentin (Neurontin) 300 mg capsule, Take 1 capsule (300 mg total) by mouth 3 (three) times a day, Disp: 90 capsule, Rfl: 0  •  losartan-hydrochlorothiazide (HYZAAR) 100-25 MG per tablet, , Disp: , Rfl:   •  methocarbamol (ROBAXIN) 500 mg tablet, , Disp: , Rfl:   •  metoprolol tartrate (LOPRESSOR) 50 mg tablet, Take 50 mg by mouth 2 (two) times a day, Disp: , Rfl:   •  omeprazole (PriLOSEC OTC) 20 MG tablet, Take 1 tablet (20 mg total) by mouth daily, Disp: 90 tablet, Rfl: 1  •  terbinafine (LamISIL) 250 mg tablet, , Disp: , Rfl:   •  losartan (COZAAR) 100 MG tablet, Take 1 tablet (100 mg total) by mouth daily (Patient not taking: Reported on 6/14/2023), Disp: 90 tablet, Rfl: 1  •  metoprolol succinate (TOPROL-XL) 50 mg 24 hr tablet, , Disp: , Rfl:   •  rosuvastatin (CRESTOR) 20 MG tablet, , Disp: , Rfl:   •  senna-docusate sodium (SENOKOT S) 8.6-50 mg per tablet, Take 1 tablet by mouth 2 (two) times a day (Patient not taking: Reported on 6/14/2023), Disp: , Rfl:   •  traMADol (Ultram) 50 mg tablet, Take 1 tablet (50 mg total) by mouth every 6 (six) hours as needed for moderate pain (Patient not taking: Reported on 6/14/2023), Disp: 30 tablet, Rfl: 0  •  traMADol (Ultram) 50 mg tablet, Take 1 tablet (50 mg total) by mouth every 6 (six) hours as needed for moderate pain (Patient not taking: Reported on 6/14/2023), Disp: 120 tablet, Rfl: 0    No Known Allergies    Physical Exam:    /98   Pulse 62   Resp 16   Ht 5' 7" (1.702 m)   Wt 93.5 kg (206 lb 3.2 oz)   BMI 32.30 kg/m²     Constitutional:normal, well developed, well nourished, alert, in no distress and non-toxic and no overt pain behavior.   Eyes:anicteric  HEENT:grossly intact  Neck:supple, symmetric, trachea midline and no masses   Pulmonary:even and unlabored  Cardiovascular:No edema or pitting edema present  Skin:Normal without rashes or lesions and well hydrated  Psychiatric:Mood and affect appropriate  Neurologic:Cranial Nerves II-XII grossly intact  Musculoskeletal:antalgic    Imaging    Study Result    Narrative & Impression   CT left knee without IV contrast     INDICATION: S63.452: Presence of left artificial knee joint.     COMPARISON: 3/21/2023.     TECHNIQUE: CT examination of the above was performed. This examination, like all CT scans performed in the [facility], was performed utilizing techniques to minimize radiation dose exposure, including the use of iterative reconstruction   and automated exposure control software. Multiplanar 2D reformatted images were created from the source data.     Rad dose  979.53 mGy-cm     FINDINGS:     OSSEOUS STRUCTURES: There is a total knee condylar arthroplasty in place. The components appear well seated and articulate appropriately with each other. No surrounding lucency or periprosthetic fracture identified.     VISUALIZED MUSCULATURE:  Unremarkable.     SOFT TISSUES: There is mild anterior subcutaneous edema     OTHER PERTINENT FINDINGS: There is a small joint effusion with mild synovial thickening, nonspecific.     IMPRESSION:     Status post total knee condylar arthroplasty as above. No acute osseous abnormality. Small joint effusion with mild synovial thickening, nonspecific.              Study Result    Narrative & Impression   MRI LUMBAR SPINE WITHOUT CONTRAST     INDICATION: M54.16: Radiculopathy, lumbar region  G62.9: Polyneuropathy, unspecified  M47.816: Spondylosis without myelopathy or radiculopathy, lumbar region  M51.36: Other intervertebral disc degeneration, lumbar region.     COMPARISON:  None.     TECHNIQUE:  Multiplanar, multisequence imaging of the lumbar spine was performed. .        IMAGE QUALITY:  Diagnostic     FINDINGS:     VERTEBRAL BODIES:  There are 5 lumbar type vertebral bodies. There is mild levoscoliosis. Grade 1 anterolisthesis at L5-S1. Mild retrolisthesis at L2-3.   There are fat-containing vertebral body lesions in multiple level consistent with benign hemangioma. There 1 cm L3 vertebral body lesion with minimal mainly peripheral fat density likely representing atypical (fat poor) hemangioma.     Modic type II endplate degenerative signal at L5-S1.     SACRUM:  Normal signal within the sacrum. No evidence of insufficiency or stress fracture.     DISTAL CORD AND CONUS:  Normal size and signal within the distal cord and conus.     PARASPINAL SOFT TISSUES:  Paraspinal soft tissues are unremarkable.     LOWER THORACIC DISC SPACES:  Normal disc height and signal.  No disc herniation, canal stenosis or foraminal narrowing.     LUMBAR DISC SPACES:     L1-L2: Minimal disc bulge. Moderate facet arthropathy. No significant canal or foraminal stenosis.     L2-L3: Mild disc bulge. Mild to moderate facet arthropathy. Mild canal narrowing. Mild bilateral foraminal narrowing.     L3-L4: Disc bulge. There is right foraminal/extraforaminal disc protrusion. Marginal endplate osteophytes. Moderate facet arthropathy. Mild canal narrowing. Moderate right and mild left foraminal narrowing. Right foraminal/extraforaminal herniated disc   abuts undersurface of the exiting right L3 nerve root.     L4-L5: Minimal disc bulge. Small left foraminal disc protrusion. Moderate facet arthropathy. Minor canal narrowing. Mild bilateral foraminal narrowing.     L5-S1: Disc bulge. Moderate facet arthropathy. Minimal canal narrowing. Mild bilateral foraminal stenosis.     OTHER FINDINGS:  None.     IMPRESSION:     1. Mild multilevel degenerative canal narrowing as detailed. 2.  No high-grade foraminal stenosis. Moderate right foraminal narrowing at L3-4.  3.  Right foraminal/extraforaminal disc protrusion at level L3-4 contacts undersurface of exiting right L3 nerve root.        Workstation performed: [id number]           No orders to display       No orders of the defined types were placed in this encounter.

## 2023-08-04 ENCOUNTER — TELEPHONE (OUTPATIENT)
Dept: PSYCHIATRY | Facility: CLINIC | Age: 53
End: 2023-08-04

## 2023-08-25 ENCOUNTER — APPOINTMENT (OUTPATIENT)
Dept: RADIOLOGY | Facility: HOSPITAL | Age: 53
End: 2023-08-25
Payer: COMMERCIAL

## 2023-08-25 ENCOUNTER — HOSPITAL ENCOUNTER (OUTPATIENT)
Facility: HOSPITAL | Age: 53
Setting detail: OUTPATIENT SURGERY
Discharge: HOME | End: 2023-08-25
Attending: ANESTHESIOLOGY | Admitting: ANESTHESIOLOGY
Payer: COMMERCIAL

## 2023-08-25 ENCOUNTER — TELEPHONE (OUTPATIENT)
Dept: PAIN MEDICINE | Facility: CLINIC | Age: 53
End: 2023-08-25

## 2023-08-25 VITALS
SYSTOLIC BLOOD PRESSURE: 156 MMHG | BODY MASS INDEX: 32.33 KG/M2 | RESPIRATION RATE: 20 BRPM | OXYGEN SATURATION: 93 % | WEIGHT: 206 LBS | HEIGHT: 67 IN | HEART RATE: 53 BPM | TEMPERATURE: 96 F | DIASTOLIC BLOOD PRESSURE: 78 MMHG

## 2023-08-25 DIAGNOSIS — M25.562 CHRONIC PAIN OF LEFT KNEE: ICD-10-CM

## 2023-08-25 DIAGNOSIS — Z96.652 STATUS POST LEFT KNEE REPLACEMENT: ICD-10-CM

## 2023-08-25 DIAGNOSIS — G89.29 CHRONIC PAIN OF LEFT KNEE: ICD-10-CM

## 2023-08-25 DIAGNOSIS — M24.662 ARTHROFIBROSIS OF KNEE JOINT, LEFT: ICD-10-CM

## 2023-08-25 DIAGNOSIS — M47.816 LUMBAR SPONDYLOSIS: Primary | ICD-10-CM

## 2023-08-25 DIAGNOSIS — Z96.652 HISTORY OF ARTHROPLASTY OF LEFT KNEE: ICD-10-CM

## 2023-08-25 RX ORDER — TRAMADOL HYDROCHLORIDE 50 MG/1
50 TABLET ORAL EVERY 6 HOURS PRN
Qty: 120 TABLET | Refills: 0 | Status: SHIPPED | OUTPATIENT
Start: 2023-08-25

## 2023-08-25 NOTE — TELEPHONE ENCOUNTER
Attempted to call patient, went straight to , left message to call office. CB# provided                ----- Message from Migel Kwon MD sent at 8/25/2023  9:29 AM EDT -----  Can you contact pt to let her know the information for [facility], I also sent a message to the [facility] pool to contact pt to coordinate her to come to the office to sign opioid contract and provide a urine on Monday.  I started her on tramadol and cancelled her procedure    Emirati SPEAKING ONLY

## 2023-08-25 NOTE — DISCHARGE INSTR - AVS FIRST PAGE

## 2023-08-25 NOTE — INTERVAL H&P NOTE
H&P reviewed. After examining the patient I find no changes in the patients condition since the H&P had been written.     Vitals:    08/25/23 0842   BP: 145/75   Pulse: (!) 50   Resp: 18   Temp: (!) 96.5 °F (35.8 °C)   SpO2: 94%

## 2023-08-28 NOTE — TELEPHONE ENCOUNTER
Caller: patient    Doctor: ANNEMARIE    Reason for call: returning missed call  [place] speaker  Call back#:

## 2023-08-29 NOTE — TELEPHONE ENCOUNTER
Spoke with patient she will come by the office next week Tuesday 9-930 am to sign the contract and to leave a urine sample.

## 2023-09-07 LAB
6MAM UR QL CFM: NEGATIVE NG/ML
7AMINOCLONAZEPAM UR QL CFM: NEGATIVE NG/ML
A-OH ALPRAZ UR QL CFM: NEGATIVE NG/ML
ACCEPTABLE CREAT UR QL: NORMAL MG/DL
ACCEPTIBLE SP GR UR QL: NORMAL
AMITRIP UR QL CFM: NEGATIVE NG/ML
AMPHET UR QL CFM: NEGATIVE NG/ML
AMPHET UR QL CFM: NEGATIVE NG/ML
BUPRENORPHINE UR QL CFM: NEGATIVE NG/ML
BZE UR QL CFM: NEGATIVE NG/ML
DESIPRAMINE UR QL CFM: NEGATIVE NG/ML
EDDP UR QL CFM: NEGATIVE NG/ML
ETHYL GLUCURONIDE UR QL CFM: NEGATIVE NG/ML
ETHYL SULFATE UR QL SCN: NEGATIVE NG/ML
FENTANYL UR QL CFM: NEGATIVE NG/ML
FLUOXETINE UR QL CFM: NEGATIVE NG/ML
GLIADIN IGG SER IA-ACNC: NEGATIVE NG/ML
GLUCOSE 30M P 50 G LAC PO SERPL-MCNC: NEGATIVE NG/ML
HYDROCODONE UR QL CFM: NEGATIVE NG/ML
HYDROMORPHONE UR QL CFM: NEGATIVE NG/ML
LORAZEPAM UR QL CFM: NEGATIVE NG/ML
MDMA UR QL CFM: NEGATIVE NG/ML
ME-PHENIDATE UR QL CFM: NEGATIVE NG/ML
MEPERIDINE UR QL CFM: NEGATIVE NG/ML
METHADONE UR QL CFM: NEGATIVE NG/ML
METHAMPHET UR QL CFM: NEGATIVE NG/ML
MORPHINE UR QL CFM: NEGATIVE NG/ML
NITRITE UR QL: NORMAL UG/ML
NORBUPRENORPHINE UR QL CFM: NEGATIVE NG/ML
NORDIAZEPAM UR QL CFM: NEGATIVE NG/ML
NORFENTANYL UR QL CFM: NEGATIVE NG/ML
NORFLUOXETINE UR QL CFM: NEGATIVE NG/ML
NORHYDROCODONE UR QL CFM: NEGATIVE NG/ML
NORMEPERIDINE UR QL CFM: NEGATIVE NG/ML
NOROXYCODONE UR QL CFM: NEGATIVE NG/ML
NORTRIP UR QL CFM: NEGATIVE NG/ML
OLANZAPINE QUANTIFICATION: NEGATIVE NG/ML
OPC-3373 QUANTIFICATION: NEGATIVE
OXAZEPAM UR QL CFM: NEGATIVE NG/ML
OXYCODONE UR QL CFM: NEGATIVE NG/ML
OXYMORPHONE UR QL CFM: NEGATIVE NG/ML
OXYMORPHONE UR QL CFM: NEGATIVE NG/ML
PARA-FLUOROFENTANYL QUANTIFICATION: NORMAL NG/ML
PROLACTIN SERPL-MCNC: NEGATIVE NG/ML
RESULT ALL_PRESCRIBED MEDS AND SPECIAL INSTRUCTIONS: NORMAL
SECOBARBITAL UR QL CFM: NEGATIVE NG/ML
SL AMB 4-ANPP QUANTIFICATION: NORMAL NG/ML
SL AMB 7-OH-MITRAGYNINE (KRATOM ALKALOID) QUANTIFICATION: NEGATIVE NG/ML
SL AMB ACETYL FENTANYL QUANTIFICATION: NORMAL NG/ML
SL AMB ACETYL NORFENTANYL QUANTIFICATION: NORMAL NG/ML
SL AMB ACRYL FENTANYL QUANTIFICATION: NORMAL NG/ML
SL AMB CARFENTANIL QUANTIFICATION: NORMAL NG/ML
SL AMB CITALOPRAM/ESCITALOPRAM QUANTIFICATION: NEGATIVE NG/ML
SL AMB CITALOPRAM/ESCITALOPRAM QUANTIFICATION: NEGATIVE NG/ML
SL AMB CLOZAPINE QUANTIFICATION: NEGATIVE NG/ML
SL AMB CTHC (MARIJUANA METABOLITE) QUANTIFICATION: NEGATIVE NG/ML
SL AMB DEXTRORPHAN (DEXTROMETHORPHAN METABOLITE) QUANT: NEGATIVE NG/ML
SL AMB HALOPERIDOL  QUANTIFICATION: NEGATIVE NG/ML
SL AMB HALOPERIDOL METABOLITE QUANTIFICATION: NEGATIVE NG/ML
SL AMB HYDROXYBUPROPION QUANTIFICATION: NEGATIVE NG/ML
SL AMB HYDROXYRISPERIDONE QUANTIFICATION: NEGATIVE NG/ML
SL AMB N-DESMETHYL-TRAMADOL QUANTIFICATION: NORMAL NG/ML
SL AMB N-DESMETHYLCLOZAPINE QUANTIFICATION: NEGATIVE NG/ML
SL AMB NORQUETIAPINE QUANTIFICATION: NEGATIVE NG/ML
SL AMB PHENTERMINE QUANTIFICATION: NEGATIVE NG/ML
SL AMB PREGABALIN QUANTIFICATION: NEGATIVE
SL AMB QUETIAPINE QUANTIFICATION: NEGATIVE NG/ML
SL AMB RISPERIDONE QUANTIFICATION: NEGATIVE NG/ML
SL AMB RITALINIC ACID QUANTIFICATION: NEGATIVE NG/ML
SPECIMEN PH ACCEPTABLE UR: NORMAL
TAPENTADOL UR QL CFM: NEGATIVE NG/ML
TEMAZEPAM UR QL CFM: NEGATIVE NG/ML
TEMAZEPAM UR QL CFM: NEGATIVE NG/ML
TRAMADOL UR QL CFM: NORMAL NG/ML
URATE/CREAT 24H UR: NORMAL NG/ML

## 2023-09-12 ENCOUNTER — OFFICE VISIT (OUTPATIENT)
Dept: OBGYN CLINIC | Facility: CLINIC | Age: 53
End: 2023-09-12
Payer: COMMERCIAL

## 2023-09-12 VITALS
WEIGHT: 207.4 LBS | SYSTOLIC BLOOD PRESSURE: 139 MMHG | BODY MASS INDEX: 32.55 KG/M2 | HEIGHT: 67 IN | DIASTOLIC BLOOD PRESSURE: 96 MMHG | HEART RATE: 64 BPM

## 2023-09-12 DIAGNOSIS — Z96.652 HISTORY OF ARTHROPLASTY OF LEFT KNEE: ICD-10-CM

## 2023-09-12 DIAGNOSIS — M24.662 ARTHROFIBROSIS OF KNEE JOINT, LEFT: Primary | ICD-10-CM

## 2023-09-12 PROCEDURE — 99213 OFFICE O/P EST LOW 20 MIN: CPT | Performed by: STUDENT IN AN ORGANIZED HEALTH CARE EDUCATION/TRAINING PROGRAM

## 2023-09-12 NOTE — PROGRESS NOTES
Orthopedic Surgery Office Note  Cari Clark (89 y.o. female)   : 1970   MRN: [id number]   Encounter Date: 2023  Dr. John Lopez DO, Orthopedic Surgeon  Orthopedic Oncology [facility] Surgery   Chief Complaint   Patient presents with   • Left Knee - Follow-up     Patient is still having pain on the knee. She c/o at night when moving she feels more pain than usual.       Assessment / Plan  Problem List Items Addressed This Visit    None  Visit Diagnoses     Arthrofibrosis of knee joint, left    -  Primary    History of arthroplasty of left knee              Discussion:   • Left knee pain status post left total knee arthroplasty performed on 11/10/2022 with subsequent SWATHI on 2023 by [facility]. • Discussed that in the absence of a mechanical problem and without a known pain generator it is difficult to guide further treatment. There is no indication for further surgical intervention at this time. • She was encouraged to continue care with Dr. Madi Lacey and explore other pain management options. Surgery:   • No surgery planned at this time    Plan:   · Continue treatment plan with Dr. Madi Lacey at pain management. Return if symptoms worsen or fail to improve. History of Present Illness: Cari Clark is a 45 y.o. female who presents to the office for follow-up of left knee pain status post left total knee arthroplasty performed on 11/10/2022 with subsequent SWATHI on 2023. At last visit she was provided with a referral to pain management for consideration of genicular block. She has since had another opinion with her primary surgeon, Dr. Vanesa Darling at [facility]. She continues to experience medial, lateral, and deep left knee pain made worse with motion. She notes that her pain is constant. Her pain has been keeping her awake throughout the night. She did not get the genicular block with Dr. Madi Lacey. She has started tramadol to control her pain.     Translation provided by [facility] employee today in the office. Review of Systems  Constitutional: Negative for fatigue, fever or loss of appetite. HENT: Negative. Respiratory: Negative for shortness of breath, dyspnea. Cardiovascular: Negative for chest pain/tightness. Gastrointestinal: Negative for abdominal pain, N/V. Endocrine: Negative for cold/heat intolerance, unexplained weight loss/gain. Genitourinary: Negative for flank pain, dysuria  Musculoskeletal: Positive for arthralgia   Skin: Negative for rash. Neurological: Negative for numbness or tingling  Psychiatric/Behavioral: Negative for agitation. Physical Exam  /96   Pulse 64   Ht 5' 7" (1.702 m)   Wt 94.1 kg (207 lb 6.4 oz)   BMI 32.48 kg/m²   Cons: Appears well. No apparent distress. Psych: Alert. Oriented x3. Mood and affect normal.  Eyes: PERRLA, EOMI  Resp: Normal effort. No audible wheezing or stridor. CV: Extremities warm and well perfused. Abd:    No distention or guarding. Skin: Warm. No visible lesions. Neuro: Normal muscle tone. Orthopedic Exam:   Left Knee Exam  Alignment:  Normal knee alignment. Inspection:  Incision healed. Palpation:  Global tenderness. ROM:  Knee Extension 5 with pain. Knee Flexion 90 with pain. Strength:  Able to actively extend knee against gravity. Stability:  No objective knee instability. Stable Varus / Valgus stress, Lachman, and Posterior drawer. Tests:  No pertinent positive or negative tests. Patella:  Patella tracks centrally without crepitus. Gait:  Antalgic. Studies Reviewed  No new imaging studies obtained    Procedures  No procedures today. Medical, Surgical, Family, and Social History  The patient's medical history, family history, and social history, were reviewed and updated as appropriate.     Past Medical History:   Diagnosis Date   • Anxiety    • Depression    • GERD (gastroesophageal reflux disease)    • Hypertension      Past Surgical History:   Procedure Laterality Date • KNEE ARTHROPLASTY Left      History reviewed. No pertinent family history.   Social History     Occupational History   • Not on file   Tobacco Use   • Smoking status: Never   • Smokeless tobacco: Never   Vaping Use   • Vaping Use: Never used   Substance and Sexual Activity   • Alcohol use: Never   • Drug use: Never   • Sexual activity: Not Currently     No Known Allergies    Current Outpatient Medications:   •  acetaminophen (TYLENOL) 500 mg tablet, Take 500 mg by mouth every 6 (six) hours as needed, Disp: , Rfl:   •  amLODIPine (NORVASC) 5 mg tablet, , Disp: , Rfl:   •  Cholecalciferol (Vitamin D3) 1.25 MG (91526 UT) CAPS, , Disp: , Rfl:   •  gabapentin (Neurontin) 300 mg capsule, Take 1 capsule (300 mg total) by mouth 3 (three) times a day, Disp: 90 capsule, Rfl: 0  •  losartan-hydrochlorothiazide (HYZAAR) 100-25 MG per tablet, , Disp: , Rfl:   •  methocarbamol (ROBAXIN) 500 mg tablet, , Disp: , Rfl:   •  metoprolol tartrate (LOPRESSOR) 50 mg tablet, Take 50 mg by mouth 2 (two) times a day, Disp: , Rfl:   •  omeprazole (PriLOSEC OTC) 20 MG tablet, Take 1 tablet (20 mg total) by mouth daily, Disp: 90 tablet, Rfl: 1  •  traMADol (Ultram) 50 mg tablet, Take 1 tablet (50 mg total) by mouth every 6 (six) hours as needed for moderate pain, Disp: 120 tablet, Rfl: 0  •  Diclofenac Sodium (VOLTAREN) 1 %, Apply 1 application topically, Disp: , Rfl:   •  diclofenac sodium (VOLTAREN) 50 mg EC tablet, Take 1 tablet (50 mg total) by mouth 2 (two) times a day, Disp: 60 tablet, Rfl: 1  •  DULoxetine (CYMBALTA) 30 mg delayed release capsule, Take 1 capsule (30 mg total) by mouth daily, Disp: 30 capsule, Rfl: 1  •  losartan (COZAAR) 100 MG tablet, Take 1 tablet (100 mg total) by mouth daily (Patient not taking: Reported on 9/12/2023), Disp: 90 tablet, Rfl: 1    25 minutes was spent in the coordination of care, reviewing of imaging and with the patient on the date of service    [place] Huupy    I,: Yael Harris am acting as a scribe while in the presence of the attending physician.:       I,:  José Manuel Senior, DO personally performed the services described in this documentation    as scribed in my presence.:

## 2023-10-09 ENCOUNTER — OFFICE VISIT (OUTPATIENT)
Dept: PAIN MEDICINE | Facility: CLINIC | Age: 53
End: 2023-10-09
Payer: COMMERCIAL

## 2023-10-09 VITALS
HEIGHT: 67 IN | WEIGHT: 212.2 LBS | SYSTOLIC BLOOD PRESSURE: 145 MMHG | RESPIRATION RATE: 16 BRPM | DIASTOLIC BLOOD PRESSURE: 97 MMHG | HEART RATE: 77 BPM | BODY MASS INDEX: 33.3 KG/M2

## 2023-10-09 DIAGNOSIS — M25.562 CHRONIC PAIN OF LEFT KNEE: ICD-10-CM

## 2023-10-09 DIAGNOSIS — Z96.652 STATUS POST LEFT KNEE REPLACEMENT: ICD-10-CM

## 2023-10-09 DIAGNOSIS — G89.4 CHRONIC PAIN SYNDROME: Primary | ICD-10-CM

## 2023-10-09 DIAGNOSIS — G89.29 CHRONIC PAIN OF LEFT KNEE: ICD-10-CM

## 2023-10-09 PROCEDURE — 99214 OFFICE O/P EST MOD 30 MIN: CPT | Performed by: NURSE PRACTITIONER

## 2023-10-09 RX ORDER — GABAPENTIN 300 MG/1
300 CAPSULE ORAL 3 TIMES DAILY
Qty: 90 CAPSULE | Refills: 0
Start: 2023-10-09

## 2023-10-09 RX ORDER — TRAMADOL HYDROCHLORIDE 50 MG/1
50 TABLET ORAL EVERY 6 HOURS PRN
Qty: 120 TABLET | Refills: 1 | Status: SHIPPED | OUTPATIENT
Start: 2023-10-09

## 2023-10-09 NOTE — PATIENT INSTRUCTIONS
Opioid Safety   WHAT YOU NEED TO KNOW:   An opioid medicine is used to treat pain. Examples are oxycodone, morphine, fentanyl, or codeine. Pain control and management may help you rest, heal, and return to your daily activities. You and your family will receive information about how to manage your pain at home. The instructions will include what to do if you have side effects as your pain is managed. You will get information on how to handle opioid medicine safely. You will also get suggestions on how to control pain without opioids. It is important to follow all instructions so your pain is managed effectively. DISCHARGE INSTRUCTIONS:   Call your local emergency number (911 in the ), or have someone else call if:   You have a seizure. You cannot be woken. You have trouble staying awake and your breathing is slow or shallow. Your speech is slurred, or you are confused. You are dizzy or stumble when you walk. Call your doctor, or have someone close to you call if:   You are extremely drowsy, or you have trouble staying awake or speaking. You have pale or clammy skin. You have blue fingernails or lips. Your heartbeat is slower than normal.    You cannot stop vomiting. You have questions or concerns about your condition or care. Use opioids safely:   Take prescribed opioids exactly as directed. Opioids come with directions based on the kind and how it is given. Talk to your healthcare provider or a pharmacist if you have any questions. Do not take more than the recommended amount. Too much can cause a life-threatening overdose. Do not continue to take it after your pain stops. You may develop tolerance. This means you keep needing higher doses to get the same effect. You may also develop opioid use disorder. This means you are not able to control your opioid use. Do not give opioids to others or take opioids that belong to someone else.   The kind or amount one person takes may not be right for another. The person you share them with may also be taking medicines that do not mix with opioids. The person may drink alcohol or use other drugs that can cause life-threatening problems when mixed with opioids. Do not mix opioids with other medicines or alcohol. The combination can cause an overdose, or cause you to stop breathing. Alcohol, sleeping pills, and medicines such as antihistamines can make you sleepy. A combination with opioids can lead to a coma. Do not drive or operate heavy machinery after you use an opioid. You may feel drowsy or have trouble concentrating. You can injure yourself or others if you drive or use heavy machinery when you are not alert. Your provider or pharmacist can tell you how long to wait after a dose before you do these activities. Talk to your healthcare provider if you have any side effects. Side effects include nausea, sleepiness, itching, and trouble thinking clearly. Your provider may need to make changes to the kind or amount of opioid you are taking. Your provider can also help you find ways to prevent or relieve side effects. Manage constipation:  Constipation is the most common side effect of opioid medicine. Constipation is when you have hard, dry bowel movements, or you go longer than usual between bowel movements. Tell your healthcare provider about all changes in your bowel movements while you are taking opioids. Your provider may recommend laxative medicine to help you have a bowel movement. Your provider may also change the kind of opioid you are taking, or change when you take it. The following are more ways you can prevent or relieve constipation:  Drink liquids as directed. You may need to drink extra liquids to help soften and move your bowels. Ask how much liquid to drink each day and which liquids are best for you. Eat high-fiber foods. This may help decrease constipation by adding bulk to your bowel movements.  High-fiber foods include fruits, vegetables, whole-grain breads and cereals, and beans. Your healthcare provider or dietitian can help you create a high-fiber meal plan. Your provider may also recommend a fiber supplement if you cannot get enough fiber from food. Exercise regularly. Regular physical activity can help stimulate your intestines. Walking is a good exercise to prevent or relieve constipation. Ask which exercises are best for you. Schedule a time each day to have a bowel movement. This may help train your body to have regular bowel movements. Bend forward while you are on the toilet to help move the bowel movement out. Sit on the toilet for at least 10 minutes, even if you do not have a bowel movement. Store opioids safely:   Store opioids where others cannot easily get them. Keep them in a locked cabinet or secure area. Do not  keep them in a purse or other bag you carry with you. A person may be looking for something else and find the opioids. Make sure opioids are stored out of the reach of children. A child can easily overdose on opioids. Opioids may look like candy to a small child. The best way to dispose of opioids: The laws vary by country and area. In the [facility], the best way is to return the opioids through a take-back program. This program is offered by the [employer] The following are options for using the program:  Take the opioids to a ALISSA collection site. The site is often a law enforcement center. Call your local law enforcement center for scheduled take-back days in your area. You will be given information on where to go if the collection site is in a different location. Take the opioids to an approved pharmacy or hospital.  A pharmacy or hospital may be set up as a collection site. You will need to ask if it is a ALISSA collection site if you were not directed there.  A pharmacy or doctor's office may not be able to take back opioids unless it is a ALISSA site. Use a mail-back system. This means you are given containers to put the opioids into. You will then mail them in the containers. Use a take-back drop box. This is a place to leave the opioids at any time. People and animals will not be able to get into the box. Your local law enforcement agency can tell you where to find a drop box in your area. Other safe ways to dispose of opioids: The medicine may come with disposal instructions. The instructions may vary depending on the brand of medicine you are using. Instructions may come in a Medication Guide, but not every medicine has one. You may instead get instructions from your pharmacy or doctor. Follow instructions carefully. The following are general guidelines to follow:  Find out if you can flush the opioid. Some opioids can be flushed down the toilet or poured into the sink. You will need to contact authorities in your area to see if this is an option for you. The FDA also offers a list of medicines that are safe to flush down the toilet. You can check the list if you cannot get the information for your local area. Ask your waste management company about rules for putting opioids in the trash. The company will be able to give you specific directions. Scratch out personal information on the original medicine label so it cannot be read. Then put it in the trash. Do not label the trash or put any information on it about the opioids. It should look like regular household trash so no one is tempted to look for the opioids. Keep the trash out of the reach of children and animals. Always make sure trash is secure. Talk to officials if you live in a facility. If you live in a nursing home or assisted living center, talk to an official. The person will know the rules for your area. Other ways to manage pain:   Ask your healthcare provider about non-opioid medicines to control pain.   Some medicines may even work better than opioids, depending on the cause of your pain. Nonprescription medicines include NSAIDs (such as ibuprofen) and acetaminophen. Prescription medicines include muscle relaxers, antidepressants, and steroids. Pain may be managed without any medicines. Some ways to relieve pain include massage, aromatherapy, or meditation. Physical or occupational therapy may also help. Follow up with your doctor or pain specialist as directed: You may need to have your dose adjusted. Your doctor or pain specialist can also help you find ways to manage pain without opioids. Write down your questions so you remember to ask them during your visits. For more information:   Drug Enforcement Administration  [address] [facility] , [address] [facility]  Phone: 9- 765 - [phone]  Web Address: test company.[employer]Book. [employer].gov/drug_disposal/    [address] [place] [address] and Drug Administration  [address] , [address]  Phone: 0- 774 - 065-2887  Web Address: http://BABYBOOM.ru/  © Copyright Violet Chinchilla 2023 Information is for End User's use only and may not be sold, redistributed or otherwise used for commercial purposes. The above information is an  only. It is not intended as medical advice for individual conditions or treatments. Talk to your doctor, nurse or pharmacist before following any medical regimen to see if it is safe and effective for you.

## 2023-10-09 NOTE — PROGRESS NOTES
Assessment:  1. Chronic pain syndrome    2. Chronic pain of left knee    3. Status post left knee replacement        Plan:  While the patient was in the office today, I did have a thorough conversation regarding their chronic pain syndrome, medication management, and treatment plan options. Patient is being seen for a follow-up visit. She was recently started on tramadol 50 mg every 6 hours if needed for pain. Overall, there has been 50% improvement in her pain. She has not started aqua therapy yet. The location she was planning to go to does not participate with her insurance. I provided her with a list of aqua therapy facilities near her. Renewed tramadol 50 mg every 6 hours as needed for pain with 1 refill. There are risks associated with opioid medications, including dependence, addiction and tolerance. The patient understands and agrees to use these medications only as prescribed. Potential side effects of the medications include, but are not limited to, constipation, drowsiness, addiction, impaired judgment and risk of fatal overdose if not taken as prescribed. The patient was warned against driving while taking sedation medications. Sharing medications is a felony. At this point in time, the patient is showing no signs of addiction, abuse, diversion or suicidal ideation. [place] Prescription Drug Monitoring Program report was reviewed and was appropriate     Follow-up in 8 weeks. History of Present Illness: The patient is a 45 y.o. female who presents for a follow up office visit in regards to Follow-up. The patient’s current symptoms include complaints of left knee pain. Current pain level is a 6/10. Quality pain is described as sharp, numb. Current pain medications includes: Tramadol 50 mg every 6 hours if needed for pain, gabapentin 300 mg daily. The patient reports that this regimen is providing 50% pain relief.   The patient is reporting no side effects from this pain medication regimen. I have personally reviewed and/or updated the patient's past medical history, past surgical history, family history, social history, current medications, allergies, and vital signs today. Review of Systems  Review of Systems   Gastrointestinal: Positive for nausea. Musculoskeletal: Positive for gait problem. Joint stiffness   Neurological: Positive for dizziness. All other systems reviewed and are negative. Past Medical History:   Diagnosis Date   • Anxiety    • Depression    • GERD (gastroesophageal reflux disease)    • Hypertension        Past Surgical History:   Procedure Laterality Date   • KNEE ARTHROPLASTY Left        History reviewed. No pertinent family history.     Social History     Occupational History   • Not on file   Tobacco Use   • Smoking status: Never   • Smokeless tobacco: Never   Vaping Use   • Vaping Use: Never used   Substance and Sexual Activity   • Alcohol use: Never   • Drug use: Never   • Sexual activity: Not Currently         Current Outpatient Medications:   •  acetaminophen (TYLENOL) 500 mg tablet, Take 500 mg by mouth every 6 (six) hours as needed, Disp: , Rfl:   •  amLODIPine (NORVASC) 5 mg tablet, , Disp: , Rfl:   •  Cholecalciferol (Vitamin D3) 1.25 MG (55385 UT) CAPS, , Disp: , Rfl:   •  gabapentin (Neurontin) 300 mg capsule, Take 1 capsule (300 mg total) by mouth 3 (three) times a day, Disp: 90 capsule, Rfl: 0  •  losartan-hydrochlorothiazide (HYZAAR) 100-25 MG per tablet, , Disp: , Rfl:   •  metoprolol tartrate (LOPRESSOR) 50 mg tablet, Take 50 mg by mouth 2 (two) times a day, Disp: , Rfl:   •  omeprazole (PriLOSEC OTC) 20 MG tablet, Take 1 tablet (20 mg total) by mouth daily, Disp: 90 tablet, Rfl: 1  •  traMADol (Ultram) 50 mg tablet, Take 1 tablet (50 mg total) by mouth every 6 (six) hours as needed for moderate pain, Disp: 120 tablet, Rfl: 1  •  Diclofenac Sodium (VOLTAREN) 1 %, Apply 1 application topically, Disp: , Rfl:   • losartan (COZAAR) 100 MG tablet, Take 1 tablet (100 mg total) by mouth daily (Patient not taking: Reported on 9/12/2023), Disp: 90 tablet, Rfl: 1    No Known Allergies    Physical Exam:    /97   Pulse 77   Resp 16   Ht 5' 7" (1.702 m)   Wt 96.3 kg (212 lb 3.2 oz)   BMI 33.24 kg/m²     Constitutional:normal, well developed, well nourished, alert, in no distress and non-toxic and no overt pain behavior. Eyes:anicteric  HEENT:grossly intact  Neck:supple, symmetric, trachea midline and no masses   Pulmonary:even and unlabored  Cardiovascular:No edema or pitting edema present  Skin:Normal without rashes or lesions and well hydrated  Psychiatric:Mood and affect appropriate  Neurologic:Cranial Nerves II-XII grossly intact  Musculoskeletal:Gait is slow and guarded.     Imaging  No orders to display       Orders Placed This Encounter   Procedures   • Ambulatory Referral to Physical Therapy

## 2024-04-08 ENCOUNTER — TELEPHONE (OUTPATIENT)
Age: 54
End: 2024-04-08

## 2024-04-08 NOTE — TELEPHONE ENCOUNTER
Caller: Marcus    Doctor: kocher    Reason for call: pt wouldl like to schedule another nerve block     Call back#: [phone]

## 2024-04-09 NOTE — TELEPHONE ENCOUNTER
Patient has not been seen since October.  It looks like he has an upcoming appointment scheduled with me on 4/15/2024.  We will discuss treatment options at that office visit.

## 2024-04-09 NOTE — TELEPHONE ENCOUNTER
DONALD...    S/w pt using SP interpr # [id number]. Pt states she did not have Lt GNB that was originally planned for 8/25/23 RM. Pt now would like to proceed w/ GNB due to incr pain. RN advised needing OV before proceeding since LOV 10/9/23 BK. Pt agreeable. RN provided pt w/ appt on 4/15/24 @ 0945.

## 2024-04-15 ENCOUNTER — OFFICE VISIT (OUTPATIENT)
Dept: PAIN MEDICINE | Facility: CLINIC | Age: 54
End: 2024-04-15
Payer: COMMERCIAL

## 2024-04-15 VITALS — HEIGHT: 67 IN | RESPIRATION RATE: 16 BRPM | BODY MASS INDEX: 34.53 KG/M2 | WEIGHT: 220 LBS

## 2024-04-15 DIAGNOSIS — M25.562 CHRONIC PAIN OF LEFT KNEE: ICD-10-CM

## 2024-04-15 DIAGNOSIS — F11.20 UNCOMPLICATED OPIOID DEPENDENCE (HCC): ICD-10-CM

## 2024-04-15 DIAGNOSIS — G89.29 CHRONIC PAIN OF LEFT KNEE: ICD-10-CM

## 2024-04-15 DIAGNOSIS — Z79.891 LONG-TERM CURRENT USE OF OPIATE ANALGESIC: ICD-10-CM

## 2024-04-15 DIAGNOSIS — G89.4 CHRONIC PAIN SYNDROME: Primary | ICD-10-CM

## 2024-04-15 PROCEDURE — 99214 OFFICE O/P EST MOD 30 MIN: CPT | Performed by: NURSE PRACTITIONER

## 2024-04-15 RX ORDER — ROSUVASTATIN CALCIUM 20 MG/1
TABLET, COATED ORAL
COMMUNITY
Start: 2024-04-09

## 2024-04-15 RX ORDER — TRAMADOL HYDROCHLORIDE 50 MG/1
50 TABLET ORAL EVERY 6 HOURS PRN
Qty: 120 TABLET | Refills: 2 | Status: SHIPPED | OUTPATIENT
Start: 2024-04-15

## 2024-04-15 NOTE — PROGRESS NOTES
Assessment:  1. Chronic pain syndrome    2. Chronic pain of left knee        Plan:  While the patient was in the office today, I did have a thorough conversation regarding their chronic pain syndrome, medication management, and treatment plan options.  Patient is being seen for a follow-up visit.  She continues with chronic left knee pain.  She underwent a left total knee replacement in November 2022 at [facility].  Unfortunately, she states that her pain never really improved.  We had previously discussed possibility of genicular blocks, but they were never performed.  She was recently seen by a different orthopedic specialist at [facility] on 4/4/2024 who is recommending left genicular nerve block.  Will be repeating bone scan in the future to rule out loosening hardware.    Will schedule patient for for left genicular nerve block in the near future.    We had been prescribing tramadol 50 mg every 6 hours if needed for pain.  She reports the tramadol takes some of the edge off.  She has been out of tramadol since November.  Renewed tramadol 50 mg every 6 hours if needed for pain.  A prescription was sent to her pharmacy with 2 refills.    Complete risks and benefits including bleeding, infection, tissue reaction, nerve injury and allergic reaction were discussed. The approach was demonstrated using models and literature was provided. Verbal and written consent was obtained.    There are risks associated with opioid medications, including dependence, addiction and tolerance. The patient understands and agrees to use these medications only as prescribed. Potential side effects of the medications include, but are not limited to, constipation, drowsiness, addiction, impaired judgment and risk of fatal overdose if not taken as prescribed. The patient was warned against driving while taking sedation medications.  Sharing medications is a felony. At this point in time, the patient is showing no signs of addiction, abuse, diversion  or suicidal ideation.    A urine drug screen was collected at today's office visit as part of our medication management protocol. The point of care testing results were appropriate for what was being prescribed. The specimen will be sent for confirmatory testing. The drug screen is medically necessary because the patient is either dependent on opioid medication or is being considered for opioid medication therapy and the results could impact ongoing or future treatment. The drug screen is to evaluate for the presences or absence of prescribed, non-prescribed, and/or illicit drugs/substances.    [place] Prescription Drug Monitoring Program report was reviewed and was appropriate     Follow-up 1 month after genicular block.      History of Present Illness:  The patient is a 53 y.o. female who presents for a follow up office visit in regards to Follow-up.   The patient’s current symptoms include complaints of left knee pain.  Current pain level is a 9/10.    Current pain medications includes: Currently taking over-the-counter Tylenol as needed.  Has been out of tramadol since November..  The patient reports that this regimen is providing 10% pain relief.  The patient is reporting no side effects from this pain medication regimen.    I have personally reviewed and/or updated the patient's past medical history, past surgical history, family history, social history, current medications, allergies, and vital signs today.         Review of Systems  Review of Systems   Constitutional: Negative.    HENT: Negative.     Eyes: Negative.    Respiratory: Negative.     Cardiovascular: Negative.    Gastrointestinal:  Positive for constipation.   Endocrine: Negative.    Genitourinary: Negative.    Musculoskeletal:  Positive for arthralgias, gait problem and joint swelling.        Decrease Rom  Joint stiffness  Pain in extremity   Skin:  Positive for rash.   Allergic/Immunologic: Negative.    Neurological:  Positive for weakness.  "  Hematological: Negative.    Psychiatric/Behavioral: Negative.          Anxiety           Past Medical History:   Diagnosis Date    Anxiety     Depression     GERD (gastroesophageal reflux disease)     Hypertension        Past Surgical History:   Procedure Laterality Date    KNEE ARTHROPLASTY Left        No family history on file.    Social History     Occupational History    Not on file   Tobacco Use    Smoking status: Never    Smokeless tobacco: Never   Vaping Use    Vaping status: Never Used   Substance and Sexual Activity    Alcohol use: Never    Drug use: Never    Sexual activity: Not Currently         Current Outpatient Medications:     acetaminophen (TYLENOL) 500 mg tablet, Take 500 mg by mouth every 6 (six) hours as needed, Disp: , Rfl:     amLODIPine (NORVASC) 5 mg tablet, , Disp: , Rfl:     Cholecalciferol (Vitamin D3) 1.25 MG (92796 UT) CAPS, , Disp: , Rfl:     losartan-hydrochlorothiazide (HYZAAR) 100-25 MG per tablet, , Disp: , Rfl:     metoprolol tartrate (LOPRESSOR) 50 mg tablet, Take 50 mg by mouth 2 (two) times a day, Disp: , Rfl:     omeprazole (PriLOSEC OTC) 20 MG tablet, Take 1 tablet (20 mg total) by mouth daily, Disp: 90 tablet, Rfl: 1    rosuvastatin (CRESTOR) 20 MG tablet, , Disp: , Rfl:     traMADol (Ultram) 50 mg tablet, Take 1 tablet (50 mg total) by mouth every 6 (six) hours as needed for moderate pain, Disp: 120 tablet, Rfl: 2    Diclofenac Sodium (VOLTAREN) 1 %, Apply 1 application topically, Disp: , Rfl:     losartan (COZAAR) 100 MG tablet, Take 1 tablet (100 mg total) by mouth daily (Patient not taking: Reported on 9/12/2023), Disp: 90 tablet, Rfl: 1    No Known Allergies    Physical Exam:    Resp 16   Ht 5' 7\" (1.702 m)   Wt 99.8 kg (220 lb)   BMI 34.46 kg/m²     Constitutional:normal, well developed, well nourished, alert, in no distress and non-toxic and no overt pain behavior.  Eyes:anicteric  HEENT:grossly intact  Neck:supple, symmetric, trachea midline and no masses "   Pulmonary:even and unlabored  Cardiovascular:No edema or pitting edema present  Skin:Normal without rashes or lesions and well hydrated  Psychiatric:Mood and affect appropriate  Neurologic:Cranial Nerves II-XII grossly intact  Musculoskeletal:antalgic    Imaging  No orders to display       No orders of the defined types were placed in this encounter.

## 2024-04-17 LAB
6MAM UR QL CFM: NEGATIVE NG/ML
7AMINOCLONAZEPAM UR QL CFM: NEGATIVE NG/ML
A-OH ALPRAZ UR QL CFM: NEGATIVE NG/ML
ACCEPTABLE CREAT UR QL: NORMAL MG/DL
ACCEPTIBLE SP GR UR QL: NORMAL
AMITRIP UR QL CFM: NEGATIVE NG/ML
AMPHET UR QL CFM: NEGATIVE NG/ML
AMPHET UR QL CFM: NEGATIVE NG/ML
BUPRENORPHINE UR QL CFM: NEGATIVE NG/ML
BZE UR QL CFM: NEGATIVE NG/ML
DESIPRAMINE UR QL CFM: NEGATIVE NG/ML
EDDP UR QL CFM: NEGATIVE NG/ML
ETHYL GLUCURONIDE UR QL CFM: NEGATIVE NG/ML
ETHYL SULFATE UR QL SCN: NEGATIVE NG/ML
FENTANYL UR QL CFM: NEGATIVE NG/ML
FLUOXETINE UR QL CFM: NEGATIVE NG/ML
GLIADIN IGG SER IA-ACNC: NEGATIVE NG/ML
GLUCOSE 30M P 50 G LAC PO SERPL-MCNC: NEGATIVE NG/ML
HYDROCODONE UR QL CFM: NEGATIVE NG/ML
HYDROMORPHONE UR QL CFM: NEGATIVE NG/ML
LORAZEPAM UR QL CFM: NEGATIVE NG/ML
MDMA UR QL CFM: NEGATIVE NG/ML
ME-PHENIDATE UR QL CFM: NEGATIVE NG/ML
MEPERIDINE UR QL CFM: NEGATIVE NG/ML
METHADONE UR QL CFM: NEGATIVE NG/ML
METHAMPHET UR QL CFM: NEGATIVE NG/ML
MORPHINE UR QL CFM: NEGATIVE NG/ML
NITRITE UR QL: NORMAL UG/ML
NORBUPRENORPHINE UR QL CFM: NEGATIVE NG/ML
NORDIAZEPAM UR QL CFM: NEGATIVE NG/ML
NORFENTANYL UR QL CFM: NEGATIVE NG/ML
NORFLUOXETINE UR QL CFM: NEGATIVE NG/ML
NORHYDROCODONE UR QL CFM: NEGATIVE NG/ML
NORMEPERIDINE UR QL CFM: NEGATIVE NG/ML
NOROXYCODONE UR QL CFM: NEGATIVE NG/ML
NORTRIP UR QL CFM: NEGATIVE NG/ML
OLANZAPINE QUANTIFICATION: NEGATIVE NG/ML
OPC-3373 QUANTIFICATION: NEGATIVE
OXAZEPAM UR QL CFM: NEGATIVE NG/ML
OXYCODONE UR QL CFM: NEGATIVE NG/ML
OXYMORPHONE UR QL CFM: NEGATIVE NG/ML
OXYMORPHONE UR QL CFM: NEGATIVE NG/ML
PARA-FLUOROFENTANYL QUANTIFICATION: NORMAL NG/ML
PROLACTIN SERPL-MCNC: NEGATIVE NG/ML
RESULT ALL_PRESCRIBED MEDS AND SPECIAL INSTRUCTIONS: NORMAL
SECOBARBITAL UR QL CFM: NEGATIVE NG/ML
SL AMB 4-ANPP QUANTIFICATION: NORMAL NG/ML
SL AMB 7-OH-MITRAGYNINE (KRATOM ALKALOID) QUANTIFICATION: NEGATIVE NG/ML
SL AMB ACETYL FENTANYL QUANTIFICATION: NORMAL NG/ML
SL AMB ACETYL NORFENTANYL QUANTIFICATION: NORMAL NG/ML
SL AMB ACRYL FENTANYL QUANTIFICATION: NORMAL NG/ML
SL AMB CARFENTANIL QUANTIFICATION: NORMAL NG/ML
SL AMB CITALOPRAM/ESCITALOPRAM QUANTIFICATION: NEGATIVE NG/ML
SL AMB CITALOPRAM/ESCITALOPRAM QUANTIFICATION: NEGATIVE NG/ML
SL AMB CLOZAPINE QUANTIFICATION: NEGATIVE NG/ML
SL AMB CTHC (MARIJUANA METABOLITE) QUANTIFICATION: NEGATIVE NG/ML
SL AMB DEXTRORPHAN (DEXTROMETHORPHAN METABOLITE) QUANT: NEGATIVE NG/ML
SL AMB HALOPERIDOL  QUANTIFICATION: NEGATIVE NG/ML
SL AMB HALOPERIDOL METABOLITE QUANTIFICATION: NEGATIVE NG/ML
SL AMB HYDROXYBUPROPION QUANTIFICATION: NEGATIVE NG/ML
SL AMB HYDROXYRISPERIDONE QUANTIFICATION: NEGATIVE NG/ML
SL AMB N-DESMETHYL-TRAMADOL QUANTIFICATION: ABNORMAL NG/ML
SL AMB N-DESMETHYLCLOZAPINE QUANTIFICATION: NEGATIVE NG/ML
SL AMB NORQUETIAPINE QUANTIFICATION: NEGATIVE NG/ML
SL AMB PHENTERMINE QUANTIFICATION: ABNORMAL NG/ML
SL AMB PREGABALIN QUANTIFICATION: NEGATIVE
SL AMB QUETIAPINE QUANTIFICATION: NEGATIVE NG/ML
SL AMB RISPERIDONE QUANTIFICATION: NEGATIVE NG/ML
SL AMB RITALINIC ACID QUANTIFICATION: NEGATIVE NG/ML
SPECIMEN PH ACCEPTABLE UR: NORMAL
TAPENTADOL UR QL CFM: NEGATIVE NG/ML
TEMAZEPAM UR QL CFM: NEGATIVE NG/ML
TEMAZEPAM UR QL CFM: NEGATIVE NG/ML
TRAMADOL UR QL CFM: ABNORMAL NG/ML
URATE/CREAT 24H UR: ABNORMAL NG/ML

## 2024-05-15 ENCOUNTER — TELEPHONE (OUTPATIENT)
Dept: PAIN MEDICINE | Facility: CLINIC | Age: 54
End: 2024-05-15

## 2024-06-20 ENCOUNTER — APPOINTMENT (OUTPATIENT)
Dept: RADIOLOGY | Facility: HOSPITAL | Age: 54
End: 2024-06-20
Payer: COMMERCIAL

## 2024-06-20 ENCOUNTER — HOSPITAL ENCOUNTER (OUTPATIENT)
Facility: HOSPITAL | Age: 54
Setting detail: OUTPATIENT SURGERY
Discharge: HOME | End: 2024-06-20
Attending: ANESTHESIOLOGY | Admitting: ANESTHESIOLOGY
Payer: COMMERCIAL

## 2024-06-20 VITALS
OXYGEN SATURATION: 98 % | RESPIRATION RATE: 18 BRPM | HEART RATE: 69 BPM | TEMPERATURE: 96.8 F | SYSTOLIC BLOOD PRESSURE: 140 MMHG | DIASTOLIC BLOOD PRESSURE: 79 MMHG

## 2024-06-20 PROCEDURE — 64454 NJX AA&/STRD GNCLR NRV BRNCH: CPT | Performed by: ANESTHESIOLOGY

## 2024-06-20 RX ORDER — BUPIVACAINE HYDROCHLORIDE 5 MG/ML
INJECTION, SOLUTION EPIDURAL; INTRACAUDAL AS NEEDED
Status: DISCONTINUED | OUTPATIENT
Start: 2024-06-20 | End: 2024-06-20 | Stop reason: HOSPADM

## 2024-06-20 RX ORDER — LIDOCAINE HYDROCHLORIDE 10 MG/ML
INJECTION, SOLUTION EPIDURAL; INFILTRATION; INTRACAUDAL; PERINEURAL AS NEEDED
Status: DISCONTINUED | OUTPATIENT
Start: 2024-06-20 | End: 2024-06-20 | Stop reason: HOSPADM

## 2024-06-20 NOTE — DISCHARGE INSTR - AVS FIRST PAGE

## 2024-06-20 NOTE — OP NOTE
OPERATIVE REPORT  PATIENT NAME: Marcus Roman    :  1970  MRN: [id number]  Pt Location: [facility] OR ROOM 01    SURGERY DATE: 2024    Surgeons and Role:     * Guanako Enrique MD - Primary    Preop Diagnosis:  Chronic pain of left knee [M25.562, G89.29]  Chronic pain syndrome [G89.4]    Post-Op Diagnosis Codes:     * Chronic pain of left knee [M25.562, G89.29]     * Chronic pain syndrome [G89.4]    Procedure(s):  Left - Left genicular nerve block    Specimen(s):  * No specimens in log *    Estimated Blood Loss:   Minimal    Drains:  * No LDAs found *    Anesthesia Type:   Local    Operative Indications:  Chronic pain of left knee [M25.562, G89.29]  Chronic pain syndrome [G89.4]      Operative Findings:  same      Complications:   None    Procedure and Technique:  Fluoroscopically-guided left Superior Medial, Lateral, and Inferior Medial Geniculate Nerve Blocks    After discussing the risks, benefits, and alternatives to the procedure, the patient expressed understanding and wished to proceed.  The patient was brought to the fluoroscopy suite and placed in the supine position.  Procedural pause conducted to verify:  correct patient identity, procedure to be performed and as applicable, correct side and site, correct patient position, and availability of implants, special equipment and special requirements.   Using fluoroscopy, AP and lateral visualization was utilized to identify the femoral and tibial epicondylar shaft junctions of the left knee.  The skin was sterilely prepped and draped in the usual fashion using Chloraprep skin prep. Using fluoroscopic guidance, a 3.5 inch 25 gauge needle was advanced to each target nerve (superior lateral geniculate nerve where the lateral femoral shaft meets the epicondyle, superior medial geniculate nerve where the medial femoral shaft meets the epicondyle, and the inferior medial geniculate nerve where the medial tibial shaft meets the epicondyle). After  negative aspiration, 0.5mLs of 0.5% bupivacaine was injected at each site and the needles were subsequently removed.     The patient tolerated the procedure well and there were no apparent complications. After appropriate observation, the patient was dismissed from the clinic in good condition under their own power. The patient was instructed to keep a pain diary and report the results at their follow-up appointment.              I was present for the entire procedure.    Patient Disposition:  hemodynamically stable        SIGNATURE: Guanako Enrique MD  DATE: June 20, 2024  TIME: 10:04 AM

## 2024-07-08 RX ORDER — FUROSEMIDE 20 MG/1
20 TABLET ORAL
COMMUNITY
Start: 2024-06-17

## 2024-07-08 RX ORDER — ROSUVASTATIN CALCIUM 40 MG/1
TABLET, COATED ORAL
COMMUNITY
Start: 2024-06-17

## 2024-07-09 ENCOUNTER — APPOINTMENT (OUTPATIENT)
Dept: RADIOLOGY | Facility: MEDICAL CENTER | Age: 54
End: 2024-07-09
Payer: COMMERCIAL

## 2024-07-09 ENCOUNTER — OFFICE VISIT (OUTPATIENT)
Dept: OBGYN CLINIC | Facility: CLINIC | Age: 54
End: 2024-07-09
Payer: COMMERCIAL

## 2024-07-09 VITALS
HEART RATE: 83 BPM | TEMPERATURE: 97.7 F | SYSTOLIC BLOOD PRESSURE: 121 MMHG | HEIGHT: 67 IN | DIASTOLIC BLOOD PRESSURE: 81 MMHG | BODY MASS INDEX: 34.37 KG/M2 | OXYGEN SATURATION: 99 % | WEIGHT: 219 LBS

## 2024-07-09 DIAGNOSIS — R21 RASH AND NONSPECIFIC SKIN ERUPTION: ICD-10-CM

## 2024-07-09 DIAGNOSIS — M22.2X1 PATELLOFEMORAL DISORDER OF RIGHT KNEE: ICD-10-CM

## 2024-07-09 DIAGNOSIS — M25.561 RIGHT KNEE PAIN, UNSPECIFIED CHRONICITY: Primary | ICD-10-CM

## 2024-07-09 DIAGNOSIS — M24.662 ARTHROFIBROSIS OF KNEE JOINT, LEFT: ICD-10-CM

## 2024-07-09 DIAGNOSIS — M17.11 PRIMARY OSTEOARTHRITIS OF RIGHT KNEE: ICD-10-CM

## 2024-07-09 PROCEDURE — 99214 OFFICE O/P EST MOD 30 MIN: CPT | Performed by: STUDENT IN AN ORGANIZED HEALTH CARE EDUCATION/TRAINING PROGRAM

## 2024-07-09 PROCEDURE — 73564 X-RAY EXAM KNEE 4 OR MORE: CPT

## 2024-07-09 NOTE — PROGRESS NOTES
Orthopedic Surgery Office Note  Marcus Roman (53 y.o. female)   : 1970   MRN: [id number]   Encounter Date: 2024 with Dr. Christos Vuong,   Chief Complaint   Patient presents with    Right Knee - Pain, Swelling       Assessment, Plan, & Discussion:     1.  Osteoarthritis of right knee, mild  -Discussed with patient that arthritis is a chronic, incurable, irreversible disease and that management would be focused on alleviating symptoms, as it is not possible to reverse or remove the degenerative changes. Discussed treatment options to include symptom masking with voltaren gel and OTC pain relievers, muscle strengthening with PT to have the joint rely on strong muscle rather than bad bone, and possible consideration of corticosteroid or viscosupplementation injections in the future. Discussed definitive treatment as total joint replacement, which would be an elective, pain-relieving procedure, and that symptoms and radiographs may not align, leaving it to the patient to determine when the symptoms would warrant the surgery.   - PT script Ordered    2. Itchy, red/pink rash on extensor surfaces  - reviewed that sometimes with psoriasis, or inflammatory disorder, that inflammation can also affect their joints. Could be Psoriasis, could have psoriatic arthritis. Or other seronegative spondyloarthroplathies.   - described the rash that she gets every summer as possibly autoimmune disease, which can also flare inside her knees, possibly related to her continued pain   - advised to be evaluated with rheumatology, referral provided  - medrol dose pack provided to address both arthritis flare and rash    3. S/p L TKA and manipulation  - she is tearful regarding the pain of her left knee  - she can continue her workup and care with Dr. Good with repeat bone scan in September and follow up on genicular block    3. B/l leg edema  - reviewed with her that this is a medical problem     Plan:   Follow up after  "rheumatology evaluation  Continue follow up with ortho      Surgery:   No surgery planned at this time      Orders:     Diagnoses and all orders for this visit:    Right knee pain, unspecified chronicity  -     XR knee 4+ vw right injury    Other orders  -     furosemide (LASIX) 20 mg tablet; Take 20 mg by mouth  -     rosuvastatin (CRESTOR) 40 MG tablet         History of Present Illness:     Marcus Roman is a 53 y.o. female who presents for follow up regarding right knee pain    Recently had left genicular block 3 weeks ago, with relief at first, allowing her to sleep and move comfortably, but now returned to prior.     Dr. Frazier referred her to Dr. Good for bone scans, etc    Right knee pain has been worsening recently with depending on the right knee with all the left knee pain she is having.     Review of Systems  Constitutional: Negative for fatigue, fever or loss of appetite.   HENT: Negative.    Respiratory: Negative for shortness of breath, dyspnea.    Cardiovascular: Negative for chest pain/tightness.   Gastrointestinal: Negative for abdominal pain, N/V.   Endocrine: Negative for cold/heat intolerance, unexplained weight loss/gain.   Genitourinary: Negative for flank pain, dysuria  Skin: Negative for rash.    Psychiatric/Behavioral: Negative for agitation.  All else negative unless otherwise noted in HPI    Physical Exam:   General:  /81   Pulse 83   Temp 97.7 °F (36.5 °C) (Temporal)   Ht 5' 7\" (1.702 m)   Wt 99.3 kg (219 lb)   SpO2 99%   BMI 34.30 kg/m²   Cons: Appears well.  No apparent distress.  Psych: Alert. Oriented x3.  Mood and affect normal.  Eyes: PERRLA, EOMI  Resp: Normal effort.  No audible wheezing or stridor.  CV: Extremities warm and well perfused.   Abd:    No distention or guarding.   Skin: Warm. No visible lesions.  Neuro: Normal muscle tone.      Orthopedic Exam:   right knee(s) -   Patient ambulates with antalgic gait pattern  Uses Single Point Cane assistive " device  No anatomical deformity  Skin is warm and dry to touch with pink/red rash along anterior shin  Mild generalized soft tissue swelling or effusion noted  ROM 0-110  MMT: 5/5 throughout  TTP over medial joint line, TTP over lateral joint line  Flexor and extensor mechanisms are intact   Knee is stable to varus and valgus stress  Patella tracks centrally   Calf compartments are soft and supple  Intact pulses with brisk capillary refill to the toes  Sensation light touch intact distally    Imaging/Studies:     Study: XR right knee  Date: 7/9/24  Report: I have read and agree with the radiologist report. and I have personally reviewed the imaging in PACS and my impression is as follows:  I have personally reviewed imaging and my impression is as follows:   There is no acute fracture or dislocation.  There is a small joint effusion.  Mild osteoarthritis with small osteophytes seen.  No lytic or blastic osseous lesion.  Soft tissues are unremarkable.       Procedures  No procedures today.      Medical, Surgical, Family, and Social History    The patient's medical history, family history, and social history, were reviewed and updated as appropriate.    Past Medical History:   Diagnosis Date    Anxiety     Depression     GERD (gastroesophageal reflux disease)     Hyperlipidemia     Hypertension      Past Surgical History:   Procedure Laterality Date    KNEE ARTHROPLASTY Left      No family history on file.  Social History     Occupational History    Not on file   Tobacco Use    Smoking status: Never    Smokeless tobacco: Never   Vaping Use    Vaping status: Never Used   Substance and Sexual Activity    Alcohol use: Never    Drug use: Never    Sexual activity: Not Currently     No Known Allergies    Current Outpatient Medications:     amLODIPine (NORVASC) 5 mg tablet, , Disp: , Rfl:     buPROPion (WELLBUTRIN SR) 150 mg 12 hr tablet, Take 150 mg by mouth 2 (two) times a day, Disp: , Rfl:     Cholecalciferol (Vitamin D3)  1.25 MG (48906 UT) CAPS, , Disp: , Rfl:     Diclofenac Sodium (VOLTAREN) 1 %, Apply 1 application topically, Disp: , Rfl:     furosemide (LASIX) 20 mg tablet, Take 20 mg by mouth, Disp: , Rfl:     losartan-hydrochlorothiazide (HYZAAR) 100-25 MG per tablet, , Disp: , Rfl:     rosuvastatin (CRESTOR) 40 MG tablet, , Disp: , Rfl:     traMADol (Ultram) 50 mg tablet, Take 1 tablet (50 mg total) by mouth every 6 (six) hours as needed for moderate pain, Disp: 120 tablet, Rfl: 2    acetaminophen (TYLENOL) 500 mg tablet, Take 500 mg by mouth every 6 (six) hours as needed (Patient not taking: Reported on 7/9/2024), Disp: , Rfl:     losartan (COZAAR) 100 MG tablet, Take 1 tablet (100 mg total) by mouth daily (Patient not taking: Reported on 9/12/2023), Disp: 90 tablet, Rfl: 1    metoprolol succinate (TOPROL-XL) 50 mg 24 hr tablet, Take 50 mg by mouth daily (Patient not taking: Reported on 7/9/2024), Disp: , Rfl:     metoprolol tartrate (LOPRESSOR) 50 mg tablet, Take 50 mg by mouth 2 (two) times a day (Patient not taking: Reported on 7/9/2024), Disp: , Rfl:     omeprazole (PriLOSEC OTC) 20 MG tablet, Take 1 tablet (20 mg total) by mouth daily (Patient not taking: Reported on 7/9/2024), Disp: 90 tablet, Rfl: 1    rosuvastatin (CRESTOR) 20 MG tablet, , Disp: , Rfl:       This Visit:     30 minutes was spent in the coordination of care, reviewing of imaging and with the patient on the date of service    Robb Hoffmann PA-C    I, Robb Hoffmann PA-C, scribed this note in conjunction with and in the presence of Dr. Christos Vuong DO, who performed parts of the services as described in this documentation    Dr. Christos Vuong DO, Orthopedic Surgeon  [facility]

## 2024-07-18 ENCOUNTER — EVALUATION (OUTPATIENT)
Dept: PHYSICAL THERAPY | Facility: CLINIC | Age: 54
End: 2024-07-18
Payer: COMMERCIAL

## 2024-07-18 DIAGNOSIS — M22.2X1 PATELLOFEMORAL DISORDER OF RIGHT KNEE: ICD-10-CM

## 2024-07-18 DIAGNOSIS — G89.29 CHRONIC PAIN OF RIGHT KNEE: Primary | ICD-10-CM

## 2024-07-18 DIAGNOSIS — M17.11 PRIMARY OSTEOARTHRITIS OF RIGHT KNEE: ICD-10-CM

## 2024-07-18 DIAGNOSIS — M25.561 CHRONIC PAIN OF RIGHT KNEE: Primary | ICD-10-CM

## 2024-07-18 PROCEDURE — 97140 MANUAL THERAPY 1/> REGIONS: CPT | Performed by: PHYSICAL THERAPIST

## 2024-07-18 PROCEDURE — 97161 PT EVAL LOW COMPLEX 20 MIN: CPT | Performed by: PHYSICAL THERAPIST

## 2024-07-18 NOTE — LETTER
2024    Robb Hoffmann PA-C  [address]  [address]  [facility] [address]    Patient: Marcus Roman   YOB: 1970   Date of Visit: 2024     Encounter Diagnosis     ICD-10-CM    1. Chronic pain of right knee  M25.561     G89.29       2. Primary osteoarthritis of right knee  M17.11       3. Patellofemoral disorder of right knee  M22.2X1           Dear Dr. Hoffmann:    Thank you for your recent referral of Marcus Roman. Please review the attached evaluation summary from Marcus's recent visit.     Please verify that you agree with the plan of care by signing the attached order.     If you have any questions or concerns, please do not hesitate to call.     I sincerely appreciate the opportunity to share in the care of one of your patients and hope to have another opportunity to work with you in the near future.       Sincerely,    Juancarlos Rojas, PT      Referring Provider:      I certify that I have read the below Plan of Care and certify the need for these services furnished under this plan of treatment while under my care.                    Robb Hoffmann PA-C  [address]  [address]  [facility] [address]  Via In Basket          PT Evaluation     Today's date: 2024  Patient name: Marcus Roman  : 1970  MRN: [id number]  Referring provider: Robb Hoffmann PA-C  Dx:   Encounter Diagnosis     ICD-10-CM    1. Chronic pain of right knee  M25.561     G89.29       2. Primary osteoarthritis of right knee  M17.11       3. Patellofemoral disorder of right knee  M22.2X1           Start Time: 1015  Stop Time: 1100  Total time in clinic (min): 45 minutes    Assessment  Impairments: abnormal gait, abnormal or restricted ROM, activity intolerance, impaired physical strength, lacks appropriate home exercise program, pain with function and activity limitations  Symptom irritability: moderate    Assessment details: Patient is a 52 y/o female with chief c/o R knee pain. Patient  presents with moderate tenderness to the posterior aspect of the R knee about the distal hamstring tendons and proximal gastroc. There is increased pain with decreased mobility of the R patella during an inferior and superior motion. Patient has moderate limitations to R knee mobility and strength during the assessment today. She responded well to therapy interventions which focused on improving soft tissue mobility of the R LE. She felt a decrease in pain and stiffness to the R knee upon standing post therapy interventions. Instructed patient on performance of self stretches for HEP.   Understanding of Dx/Px/POC: good     Prognosis: good    Goals  STGs:  Patient will be independent with HEP in 1-2 visits  Improve knee flexion to 130 degrees for improved tolerance with transfers and adls by 3-4 weeks.   Improve knee strength to 3+/5 for improved tolerance with adls and transfers by 3-4 weeks.   Improve knee extension to 0 degrees for improved tolerance with adls and transfers by 3-4 weeks.   Standing and walking tolerance will improve to up to 20 minutes without limitation from the R knee by 3-4 weeks.       LTGs:  Improve FOTO score from 31 To 52 Indicating improved tolerance with activities involving the LE by discharge.   Improve knee strength and rom to wnl for improved tolerance with adls, home duties, and recreation by discharge.   Patient will be able to return to normal ambulation without deviation over all terrains without pain or limitation from the knee by discharge.   Patient will be able to return to normal home duties without limitation from the knee by discharge.      Plan  Patient would benefit from: skilled physical therapy  Planned modality interventions: cryotherapy and thermotherapy: hydrocollator packs    Planned therapy interventions: ADL retraining, balance/weight bearing training, flexibility, functional ROM exercises, gait training, home exercise program, joint mobilization, manual therapy,  neuromuscular re-education, patient education, strengthening, stretching, therapeutic activities and therapeutic exercise    Frequency: 1-2x week.  Duration in weeks: 6  Plan of Care beginning date: 7/18/2024  Plan of Care expiration date: 8/29/2024  Treatment plan discussed with: patient  Plan details: Patient informed that from this point forward, to ensure adherence to the aforementioned plan of care, all or some of the treatment may be performed and carried out by a Physical Therapy Assistant (PTA) with supervision from a licensed Physical Therapist (PT) in accordance with [facility] Physical Therapy Practice Act.  Patient will continue to benefit from skilled physical therapy to address the functional deficits that were identified during the evaluation today. We will continue to progress the therapy program to address these functional deficits and achieve the established goals.           Subjective Evaluation    History of Present Illness  Mechanism of injury: Patient presents to out patient physical therapy with chief c/o R knee pain. Patient reports onset of R knee pain over the past 5 months. She notes that she had a L knee replacement in November of 2022 and has been compensating due to continued pain in the L knee which has resulted in her placing more force through the R LE. She feels that she is limited with her standing and walking tolerance along with driving secondary to increased R knee pain. She feels that when she is going up and down stairs she will at times lose her balance but denies any recent falls. She feels that when she is sitting for longer periods of time this will cause her pain in both knees when she tries to stand up. She is limited to sitting or standing in one position for about 10-15 minutes before she needs to change positions due to increased pain.           Recurrent probem    Quality of life: good    Patient Goals  Patient goals for therapy: decreased pain, increased  motion, increased strength and independence with ADLs/IADLs  Patient goal: Patient wishes to have less pain in her knee to allow for improved tolerance with her housework and tolerance for standing and walking.  Pain  Current pain ratin  At best pain ratin  At worst pain ratin  Location: R knee  Quality: discomfort, dull ache, throbbing, sharp and cramping  Relieving factors: medications, change in position and ice  Aggravating factors: walking, standing, sitting and stair climbing    Social Support  Lives with: alone    Employment status: not working  Treatments  Current treatment: physical therapy        Objective     Palpation     Right   Tenderness of the distal biceps femoris, distal semimembranosus, distal semitendinosus, lateral gastrocnemius and medial gastrocnemius.     Active Range of Motion   Left Knee   Flexion: 73 degrees   Extension: -6 degrees   Extensor la degrees     Right Knee   Extension: -14 degrees with pain  Extensor la degrees     Passive Range of Motion   Left Knee   Extension: -2 degrees     Right Knee   Flexion: 85 degrees with pain  Extension: -12 degrees with pain    Mobility   Patellar Mobility:     Right Knee   WFL: medial and lateral  Hypomobile: superior and inferior     Strength/Myotome Testing     Left Hip   Planes of Motion   Flexion: 3  Extension: 4  Abduction: 4-  Adduction: 4-    Right Hip   Planes of Motion   Flexion: 3  Extension: 4  Abduction: 4-  Adduction: 4-    Left Knee   Flexion: 4-  Extension: 4-  Quadriceps contraction: good    Right Knee   Flexion: 4-  Extension: 4-  Quadriceps contraction: good    Ambulation   Weight-Bearing Status   Assistive device used: single point cane    Observational Gait   Gait: antalgic   Increased right stance time. Decreased left stance time.   Left foot contact pattern: foot flat  Right foot contact pattern: heel to toe  Base of support: increased    Additional Observational Gait Details  Patient is ambulating with  "decreased knee extension to the L LE with decreased heel strike noted at initial contact. There is decreased gait speed with increased ENID noted.       Flowsheet Rows      Flowsheet Row Most Recent Value   PT/OT G-Codes    Current Score 31   Projected Score 52               Precautions: Hx of L TKA 11/2022      Date 7/18 IE       FOTO 31 SC       Manuals        Passive hamstring, quad, gastroc stretch 30\" 4x.                                Neuro Re-Ed                                                                Ther Ex        NuStep for strengthening and mobility NV       SAQ/LAQ NV       Heel slides NV       SLR NV                                       Ther Activity                        Gait Training                        Modalities                                               "

## 2024-07-18 NOTE — PROGRESS NOTES
PT Evaluation     Today's date: 2024  Patient name: Marcus Roman  : 1970  MRN: [id number]  Referring provider: Robb Hoffmann PA-C  Dx:   Encounter Diagnosis     ICD-10-CM    1. Chronic pain of right knee  M25.561     G89.29       2. Primary osteoarthritis of right knee  M17.11       3. Patellofemoral disorder of right knee  M22.2X1           Start Time: 1015  Stop Time: 1100  Total time in clinic (min): 45 minutes    Assessment  Impairments: abnormal gait, abnormal or restricted ROM, activity intolerance, impaired physical strength, lacks appropriate home exercise program, pain with function and activity limitations  Symptom irritability: moderate    Assessment details: Patient is a 52 y/o female with chief c/o R knee pain. Patient presents with moderate tenderness to the posterior aspect of the R knee about the distal hamstring tendons and proximal gastroc. There is increased pain with decreased mobility of the R patella during an inferior and superior motion. Patient has moderate limitations to R knee mobility and strength during the assessment today. She responded well to therapy interventions which focused on improving soft tissue mobility of the R LE. She felt a decrease in pain and stiffness to the R knee upon standing post therapy interventions. Instructed patient on performance of self stretches for HEP.   Understanding of Dx/Px/POC: good     Prognosis: good    Goals  STGs:  Patient will be independent with HEP in 1-2 visits  Improve knee flexion to 130 degrees for improved tolerance with transfers and adls by 3-4 weeks.   Improve knee strength to 3+/5 for improved tolerance with adls and transfers by 3-4 weeks.   Improve knee extension to 0 degrees for improved tolerance with adls and transfers by 3-4 weeks.   Standing and walking tolerance will improve to up to 20 minutes without limitation from the R knee by 3-4 weeks.       LTGs:  Improve FOTO score from 31 To 52 Indicating improved  tolerance with activities involving the LE by discharge.   Improve knee strength and rom to wnl for improved tolerance with adls, home duties, and recreation by discharge.   Patient will be able to return to normal ambulation without deviation over all terrains without pain or limitation from the knee by discharge.   Patient will be able to return to normal home duties without limitation from the knee by discharge.      Plan  Patient would benefit from: skilled physical therapy  Planned modality interventions: cryotherapy and thermotherapy: hydrocollator packs    Planned therapy interventions: ADL retraining, balance/weight bearing training, flexibility, functional ROM exercises, gait training, home exercise program, joint mobilization, manual therapy, neuromuscular re-education, patient education, strengthening, stretching, therapeutic activities and therapeutic exercise    Frequency: 1-2x week.  Duration in weeks: 6  Plan of Care beginning date: 7/18/2024  Plan of Care expiration date: 8/29/2024  Treatment plan discussed with: patient  Plan details: Patient informed that from this point forward, to ensure adherence to the aforementioned plan of care, all or some of the treatment may be performed and carried out by a Physical Therapy Assistant (PTA) with supervision from a licensed Physical Therapist (PT) in accordance with [facility] Physical Therapy Practice Act.  Patient will continue to benefit from skilled physical therapy to address the functional deficits that were identified during the evaluation today. We will continue to progress the therapy program to address these functional deficits and achieve the established goals.           Subjective Evaluation    History of Present Illness  Mechanism of injury: Patient presents to out patient physical therapy with chief c/o R knee pain. Patient reports onset of R knee pain over the past 5 months. She notes that she had a L knee replacement in November of   and has been compensating due to continued pain in the L knee which has resulted in her placing more force through the R LE. She feels that she is limited with her standing and walking tolerance along with driving secondary to increased R knee pain. She feels that when she is going up and down stairs she will at times lose her balance but denies any recent falls. She feels that when she is sitting for longer periods of time this will cause her pain in both knees when she tries to stand up. She is limited to sitting or standing in one position for about 10-15 minutes before she needs to change positions due to increased pain.           Recurrent probem    Quality of life: good    Patient Goals  Patient goals for therapy: decreased pain, increased motion, increased strength and independence with ADLs/IADLs  Patient goal: Patient wishes to have less pain in her knee to allow for improved tolerance with her housework and tolerance for standing and walking.  Pain  Current pain ratin  At best pain ratin  At worst pain ratin  Location: R knee  Quality: discomfort, dull ache, throbbing, sharp and cramping  Relieving factors: medications, change in position and ice  Aggravating factors: walking, standing, sitting and stair climbing    Social Support  Lives with: alone    Employment status: not working  Treatments  Current treatment: physical therapy        Objective     Palpation     Right   Tenderness of the distal biceps femoris, distal semimembranosus, distal semitendinosus, lateral gastrocnemius and medial gastrocnemius.     Active Range of Motion   Left Knee   Flexion: 73 degrees   Extension: -6 degrees   Extensor la degrees     Right Knee   Extension: -14 degrees with pain  Extensor la degrees     Passive Range of Motion   Left Knee   Extension: -2 degrees     Right Knee   Flexion: 85 degrees with pain  Extension: -12 degrees with pain    Mobility   Patellar Mobility:     Right Knee   WFL:  "medial and lateral  Hypomobile: superior and inferior     Strength/Myotome Testing     Left Hip   Planes of Motion   Flexion: 3  Extension: 4  Abduction: 4-  Adduction: 4-    Right Hip   Planes of Motion   Flexion: 3  Extension: 4  Abduction: 4-  Adduction: 4-    Left Knee   Flexion: 4-  Extension: 4-  Quadriceps contraction: good    Right Knee   Flexion: 4-  Extension: 4-  Quadriceps contraction: good    Ambulation   Weight-Bearing Status   Assistive device used: single point cane    Observational Gait   Gait: antalgic   Increased right stance time. Decreased left stance time.   Left foot contact pattern: foot flat  Right foot contact pattern: heel to toe  Base of support: increased    Additional Observational Gait Details  Patient is ambulating with decreased knee extension to the L LE with decreased heel strike noted at initial contact. There is decreased gait speed with increased ENID noted.       Flowsheet Rows      Flowsheet Row Most Recent Value   PT/OT G-Codes    Current Score 31   Projected Score 52               Precautions: Hx of L TKA 11/2022      Date 7/18 IE       FOTO 31 SC       Manuals        Passive hamstring, quad, gastroc stretch 30\" 4x.                                Neuro Re-Ed                                                                Ther Ex        NuStep for strengthening and mobility NV       SAQ/LAQ NV       Heel slides NV       SLR NV                                       Ther Activity                        Gait Training                        Modalities                               "

## 2024-07-22 ENCOUNTER — OFFICE VISIT (OUTPATIENT)
Dept: PHYSICAL THERAPY | Facility: CLINIC | Age: 54
End: 2024-07-22
Payer: COMMERCIAL

## 2024-07-22 DIAGNOSIS — G89.29 CHRONIC PAIN OF RIGHT KNEE: Primary | ICD-10-CM

## 2024-07-22 DIAGNOSIS — M25.561 CHRONIC PAIN OF RIGHT KNEE: Primary | ICD-10-CM

## 2024-07-22 DIAGNOSIS — M17.11 PRIMARY OSTEOARTHRITIS OF RIGHT KNEE: ICD-10-CM

## 2024-07-22 DIAGNOSIS — M22.2X1 PATELLOFEMORAL DISORDER OF RIGHT KNEE: ICD-10-CM

## 2024-07-22 PROCEDURE — 97110 THERAPEUTIC EXERCISES: CPT | Performed by: PHYSICAL THERAPIST

## 2024-07-22 PROCEDURE — 97140 MANUAL THERAPY 1/> REGIONS: CPT | Performed by: PHYSICAL THERAPIST

## 2024-07-22 RX ORDER — SEMAGLUTIDE 0.25 MG/.5ML
0.25 INJECTION, SOLUTION SUBCUTANEOUS WEEKLY
COMMUNITY
Start: 2024-07-10

## 2024-07-22 NOTE — PROGRESS NOTES
"Daily Note     Today's date: 2024  Patient name: Marcus Roman  : 1970  MRN: [id number]  Referring provider: Robb Hoffmann PA-C  Dx:   Encounter Diagnosis     ICD-10-CM    1. Chronic pain of right knee  M25.561     G89.29       2. Primary osteoarthritis of right knee  M17.11       3. Patellofemoral disorder of right knee  M22.2X1           Start Time: 0840  Stop Time: 925  Total time in clinic (min): 45 minutes    Subjective: Patient reports mild improvements with R knee pain entering therapy today. She notes that she has been doing her exercises at home which seem to be loosening her leg and decreasing the pain in her knee. She feels that the L knee is still bothering her more than the R currently.       Objective: See treatment diary below      Assessment: Tolerated treatment well. Patient demonstrated fatigue post treatment, exhibited good technique with therapeutic exercises, and would benefit from continued PT Therapy interventions were focused on progression of LE strengthening along with continued focus on improving R LE mobility. She noted difficulties with bridges and standing gastroc stretch secondary to increased pain of the L LE. Decreased tightness noted from the patient post passive stretching post session today.       Plan: Continue per plan of care.  Progress treatment as tolerated.       Precautions: Hx of L TKA 2022      Date  IE       FOTO 31 SC       Manuals        Passive hamstring, quad stretch 30\" 4x.  30\" 4x ea.                               Neuro Re-Ed                                                                Ther Ex        NuStep for strengthening and mobility NV L5 5 min      SAQ/LAQ NV LAQ 2# 5\" 15x      Heel slides NV 5\" 15x      SLR NV 2x10      Standing gastroc stretch  10\" 10x      Bridges  5\" 15x                      Ther Activity                        Gait Training                        Modalities                               "

## 2024-07-25 ENCOUNTER — OFFICE VISIT (OUTPATIENT)
Dept: PHYSICAL THERAPY | Facility: CLINIC | Age: 54
End: 2024-07-25
Payer: COMMERCIAL

## 2024-07-25 DIAGNOSIS — G89.29 CHRONIC PAIN OF RIGHT KNEE: Primary | ICD-10-CM

## 2024-07-25 DIAGNOSIS — M22.2X1 PATELLOFEMORAL DISORDER OF RIGHT KNEE: ICD-10-CM

## 2024-07-25 DIAGNOSIS — M17.11 PRIMARY OSTEOARTHRITIS OF RIGHT KNEE: ICD-10-CM

## 2024-07-25 DIAGNOSIS — M25.561 CHRONIC PAIN OF RIGHT KNEE: Primary | ICD-10-CM

## 2024-07-25 PROCEDURE — 97110 THERAPEUTIC EXERCISES: CPT

## 2024-07-25 NOTE — PROGRESS NOTES
"Daily Note     Today's date: 2024  Patient name: Marcus Roman  : 1970  MRN: [id number]  Referring provider: Robb Hoffmann PA-C  Dx:   Encounter Diagnosis     ICD-10-CM    1. Chronic pain of right knee  M25.561     G89.29       2. Primary osteoarthritis of right knee  M17.11       3. Patellofemoral disorder of right knee  M22.2X1           Start Time: 0845  Stop Time: 930  Total time in clinic (min): 45 minutes    Subjective: Pt continues with  pain of the R knee.      Objective: See treatment diary below      Assessment: Tolerated treatment fair. Patient would benefit from continued PT.       Plan: Continue per plan of care.      Precautions: Hx of L TKA 2022      Date  IE      FOTO 31 SC       Manuals        Passive hamstring, quad stretch 30\" 4x.  30\" 4x ea.  Ds_ gastroc             Neuro Re-Ed        Tandem walk   NV     Side to side   NV     Clamshell   NV                     Ther Ex        NuStep for strengthening and mobility NV L5 5 min 8m L5     SAQ/LAQ NV LAQ 2# 5\" 15x 2# 20x SAQ     Heel slides NV 5\" 15x 15x 5\"     SLR NV 2x10 2/10     Standing gastroc stretch  10\" 10x home     Bridges  5\" 15x C add 15x 3\"                     Ther Activity                        Gait Training                        Modalities        CP   10m                      "

## 2024-07-26 ENCOUNTER — OFFICE VISIT (OUTPATIENT)
Dept: PAIN MEDICINE | Facility: CLINIC | Age: 54
End: 2024-07-26
Payer: COMMERCIAL

## 2024-07-26 VITALS
WEIGHT: 215.6 LBS | SYSTOLIC BLOOD PRESSURE: 127 MMHG | TEMPERATURE: 98 F | DIASTOLIC BLOOD PRESSURE: 84 MMHG | HEART RATE: 75 BPM | BODY MASS INDEX: 33.84 KG/M2 | RESPIRATION RATE: 16 BRPM | HEIGHT: 67 IN | OXYGEN SATURATION: 97 %

## 2024-07-26 DIAGNOSIS — G89.29 CHRONIC PAIN OF LEFT KNEE: ICD-10-CM

## 2024-07-26 DIAGNOSIS — M25.562 CHRONIC PAIN OF LEFT KNEE: ICD-10-CM

## 2024-07-26 DIAGNOSIS — G89.4 CHRONIC PAIN SYNDROME: Primary | ICD-10-CM

## 2024-07-26 DIAGNOSIS — Z96.652 STATUS POST LEFT KNEE REPLACEMENT: ICD-10-CM

## 2024-07-26 PROCEDURE — 99214 OFFICE O/P EST MOD 30 MIN: CPT | Performed by: NURSE PRACTITIONER

## 2024-07-26 NOTE — PROGRESS NOTES
Assessment:  1. Chronic pain syndrome    2. Chronic pain of left knee    3. Status post left knee replacement        Plan:  While the patient was in the office today, I did have a thorough conversation regarding their chronic pain syndrome, medication management, and treatment plan options.  Patient is being seen for a follow-up visit.  She underwent a left genicular nerve block on 6/20/2024.  Patient reports that her pain was more than 60% improved for the first week, but then increased again.  At this point, we will plan to repeat the left genicular nerve block.  If she obtains good, but short-term relief again, we will proceed with genicular RFA.    Continue tramadol 50 mg which she uses as needed.  She did not require refill of this medication during today's visit.      History of Present Illness:  The patient is a 53 y.o. female who presents for a follow up office visit in regards to Knee Pain (Left - genicular nerve block).   The patient’s current symptoms include complaints of left knee pain.  Current pain level is a 9/10.  Quality pain is described as burning and cramping.    Current pain medications includes: Tramadol 50 mg as needed for pain.  The patient reports that this regimen is providing 20% pain relief.  The patient is reporting no side effects from this pain medication regimen.    I have personally reviewed and/or updated the patient's past medical history, past surgical history, family history, social history, current medications, allergies, and vital signs today.         Review of Systems  Review of Systems   Gastrointestinal:  Positive for constipation and nausea.   Musculoskeletal:  Positive for gait problem.        KNEE PAIN  DECREASED ROM  JOINT STIFFNESS   Skin:  Positive for rash.   All other systems reviewed and are negative.          Past Medical History:   Diagnosis Date    Anxiety     Depression     GERD (gastroesophageal reflux disease)     Hyperlipidemia     Hypertension        Past  Surgical History:   Procedure Laterality Date    KNEE ARTHROPLASTY Left        History reviewed. No pertinent family history.    Social History     Occupational History    Not on file   Tobacco Use    Smoking status: Never    Smokeless tobacco: Never   Vaping Use    Vaping status: Never Used   Substance and Sexual Activity    Alcohol use: Never    Drug use: Never    Sexual activity: Not Currently         Current Outpatient Medications:     buPROPion (WELLBUTRIN SR) 150 mg 12 hr tablet, Take 150 mg by mouth 2 (two) times a day, Disp: , Rfl:     Cholecalciferol (Vitamin D3) 1.25 MG (27264 UT) CAPS, , Disp: , Rfl:     Diclofenac Sodium (VOLTAREN) 1 %, Apply 1 application topically, Disp: , Rfl:     furosemide (LASIX) 20 mg tablet, Take 20 mg by mouth, Disp: , Rfl:     losartan-hydrochlorothiazide (HYZAAR) 100-25 MG per tablet, , Disp: , Rfl:     rosuvastatin (CRESTOR) 40 MG tablet, , Disp: , Rfl:     traMADol (Ultram) 50 mg tablet, Take 1 tablet (50 mg total) by mouth every 6 (six) hours as needed for moderate pain, Disp: 120 tablet, Rfl: 2    Wegovy 0.25 MG/0.5ML, Inject 0.25 mg under the skin once a week, Disp: , Rfl:     acetaminophen (TYLENOL) 500 mg tablet, Take 500 mg by mouth every 6 (six) hours as needed (Patient not taking: Reported on 7/9/2024), Disp: , Rfl:     amLODIPine (NORVASC) 5 mg tablet, , Disp: , Rfl:     losartan (COZAAR) 100 MG tablet, Take 1 tablet (100 mg total) by mouth daily (Patient not taking: Reported on 9/12/2023), Disp: 90 tablet, Rfl: 1    metoprolol succinate (TOPROL-XL) 50 mg 24 hr tablet, Take 50 mg by mouth daily (Patient not taking: Reported on 7/9/2024), Disp: , Rfl:     metoprolol tartrate (LOPRESSOR) 50 mg tablet, Take 50 mg by mouth 2 (two) times a day (Patient not taking: Reported on 7/9/2024), Disp: , Rfl:     omeprazole (PriLOSEC OTC) 20 MG tablet, Take 1 tablet (20 mg total) by mouth daily (Patient not taking: Reported on 7/9/2024), Disp: 90 tablet, Rfl: 1    rosuvastatin  "(CRESTOR) 20 MG tablet, , Disp: , Rfl:     No Known Allergies    Physical Exam:    /84   Pulse 75   Temp 98 °F (36.7 °C)   Resp 16   Ht 5' 7\" (1.702 m)   Wt 97.8 kg (215 lb 9.6 oz)   SpO2 97%   BMI 33.77 kg/m²     Constitutional:normal, well developed, well nourished, alert, in no distress and non-toxic and no overt pain behavior.  Eyes:anicteric  HEENT:grossly intact  Neck:supple, symmetric, trachea midline and no masses   Pulmonary:even and unlabored  Cardiovascular:No edema or pitting edema present  Skin:Normal without rashes or lesions and well hydrated  Psychiatric:Mood and affect appropriate  Neurologic:Cranial Nerves II-XII grossly intact  Musculoskeletal: Gait is slow and guarded.    Imaging      Narrative & Impression         RIGHT KNEE     INDICATION:   Pain in right knee.        COMPARISON: 6/14/2023     VIEWS:  XR KNEE 4+ VW RIGHT INJURY   Images: 4     FINDINGS:     There is no acute fracture or dislocation.     There is a small joint effusion.     Mild osteoarthritis with small osteophytes seen.     No lytic or blastic osseous lesion.     Soft tissues are unremarkable.     IMPRESSION:        No acute osseous abnormality.     Degenerative changes as described.        Electronically signed: 07/09/2024 09:25 AM Ronald Sin MPH,MD           No orders to display       No orders of the defined types were placed in this encounter.     "

## 2024-07-29 ENCOUNTER — OFFICE VISIT (OUTPATIENT)
Dept: PHYSICAL THERAPY | Facility: CLINIC | Age: 54
End: 2024-07-29
Payer: COMMERCIAL

## 2024-07-29 ENCOUNTER — TELEPHONE (OUTPATIENT)
Age: 54
End: 2024-07-29

## 2024-07-29 DIAGNOSIS — M25.561 CHRONIC PAIN OF RIGHT KNEE: Primary | ICD-10-CM

## 2024-07-29 DIAGNOSIS — M17.11 PRIMARY OSTEOARTHRITIS OF RIGHT KNEE: ICD-10-CM

## 2024-07-29 DIAGNOSIS — G89.29 CHRONIC PAIN OF RIGHT KNEE: Primary | ICD-10-CM

## 2024-07-29 PROCEDURE — 97110 THERAPEUTIC EXERCISES: CPT

## 2024-07-29 PROCEDURE — 97112 NEUROMUSCULAR REEDUCATION: CPT

## 2024-07-29 PROCEDURE — 97140 MANUAL THERAPY 1/> REGIONS: CPT

## 2024-07-29 NOTE — PROGRESS NOTES
"Daily Note     Today's date: 2024  Patient name: Marcus Roman  : 1970  MRN: [id number]  Referring provider: Robb Hoffmann PA-C  Dx:   Encounter Diagnosis     ICD-10-CM    1. Chronic pain of right knee  M25.561     G89.29       2. Primary osteoarthritis of right knee  M17.11           Start Time: 0845  Stop Time: 0930  Total time in clinic (min): 45 minutes    Subjective: Pt continues with discomfort of the R knee.      Objective: See treatment diary below      Assessment: Tolerated treatment well. Patient demonstrated fatigue post treatment. She was able to complete the full program today.      Plan: Continue per plan of care.      Precautions: Hx of L TKA 2022      Date  IE     FOTO 31 SC       Manuals        Passive hamstring, quad stretch 30\" 4x.  30\" 4x ea.  Ds_ gastroc Ds +gastroc            Neuro Re-Ed        Tandem walk   NV 3 laps    Side to side   NV 3 laps    Clamshell   NV 10x 5\"                    Ther Ex        NuStep for strengthening and mobility NV L5 5 min 8m L5 8m L5    SAQ/LAQ NV LAQ 2# 5\" 15x 2# 20x SAQ 2# 20x  ea    Heel slides NV 5\" 15x 15x 5\" 15x 5\"    SLR NV 2x10 2/10 2/10    Standing gastroc stretch  10\" 10x home home    Bridges c add  5\" 15x C add 15x 3\" 15x 3\"                    Ther Activity                        Gait Training                        Modalities        CP   10m 10m R                       "

## 2024-08-01 ENCOUNTER — APPOINTMENT (OUTPATIENT)
Dept: PHYSICAL THERAPY | Facility: CLINIC | Age: 54
End: 2024-08-01
Payer: COMMERCIAL

## 2024-08-03 NOTE — H&P
Assessment:  No diagnosis found.    Plan:  Marcus Roman is a 53 y.o. female with complaints of left knee pain presents to surgical center for procedure.  We will perform a Procedure(s) (LRB):  BLOCK/INJECTION PERIPHERAL NERVE  left genicular nerve block (Left)   2. Follow-up 1 month after injection    Complete risks and benefits including bleeding, infection, tissue reaction, nerve injury and allergic reaction were discussed. The approach was demonstrated using models and literature was provided. Verbal and written consent was obtained.    My impressions and treatment recommendations were discussed in detail with the patient who verbalized understanding and had no further questions.  Discharge instructions were provided. I personally saw and examined the patient and I agree with the above discussed plan of care.    Orders Placed This Encounter   Procedures    FL spine and pain procedure     Standing Status:   Standing     Number of Occurrences:   1     Order Specific Question:   Reason for Exam:     Answer:   BLOCK/INJECTION PERIPHERAL NERVE left genicular nerve block - Left     Order Specific Question:   Is the patient pregnant?     Answer:   No     Order Specific Question:   Anticoagulant hold needed?     Answer:   NO     No orders of the defined types were placed in this encounter.      History of Present Illness:  Marcus Roman is a 53 y.o. female who presents for a follow up office visit in regards to left knee pain.   The patient’s current symptoms include.  Says sharp and stabbing, throbbing pain with any particular time pattern.      I have personally reviewed and/or updated the patient's past medical history, past surgical history, family history, social history, current medications, allergies, and vital signs today.     Review of Systems   Musculoskeletal:  Positive for arthralgias.   All other systems reviewed and are negative.      Patient Active Problem List   Diagnosis    Family history of colon  cancer in mother    Fibroadenoma of left breast    Depression with anxiety    Benign essential hypertension    Insomnia    Gastroesophageal reflux disease without esophagitis    Needs flu shot    Chronic pain syndrome    Lumbar degenerative disc disease    Lumbar spondylosis    Neuropathy    Lumbar radiculopathy    Chronic pain of left knee    Status post left knee replacement       Past Medical History:   Diagnosis Date    Anxiety     Depression     GERD (gastroesophageal reflux disease)     Hyperlipidemia     Hypertension        Past Surgical History:   Procedure Laterality Date    KNEE ARTHROPLASTY Left        History reviewed. No pertinent family history.    Social History     Occupational History    Not on file   Tobacco Use    Smoking status: Never    Smokeless tobacco: Never   Vaping Use    Vaping status: Never Used   Substance and Sexual Activity    Alcohol use: Never    Drug use: Never    Sexual activity: Not Currently       No current facility-administered medications on file prior to encounter.     Current Outpatient Medications on File Prior to Encounter   Medication Sig    Cholecalciferol (Vitamin D3) 1.25 MG (98514 UT) CAPS     rosuvastatin (CRESTOR) 20 MG tablet     traMADol (Ultram) 50 mg tablet Take 1 tablet (50 mg total) by mouth every 6 (six) hours as needed for moderate pain    acetaminophen (TYLENOL) 500 mg tablet Take 500 mg by mouth every 6 (six) hours as needed    amLODIPine (NORVASC) 5 mg tablet     Diclofenac Sodium (VOLTAREN) 1 % Apply 1 application topically    losartan (COZAAR) 100 MG tablet Take 1 tablet (100 mg total) by mouth daily (Patient not taking: Reported on 9/12/2023)    losartan-hydrochlorothiazide (HYZAAR) 100-25 MG per tablet     metoprolol tartrate (LOPRESSOR) 50 mg tablet Take 50 mg by mouth 2 (two) times a day    omeprazole (PriLOSEC OTC) 20 MG tablet Take 1 tablet (20 mg total) by mouth daily       No Known Allergies    Physical Exam:    BP (!) 174/90   Pulse 85   Temp  (!) 96.8 °F (36 °C) (Tympanic)   Resp 18   SpO2 100%     Constitutional:normal, well developed, well nourished, alert, in no distress and non-toxic and no overt pain behavior. and obese  Eyes:anicteric  HEENT:grossly intact  Neck:supple, symmetric, trachea midline and no masses   Pulmonary:even and unlabored  Cardiovascular:No edema or pitting edema present  Skin:Normal without rashes or lesions and well hydrated  Psychiatric:Mood and affect appropriate  Neurologic:Cranial Nerves II-XII grossly intact  Musculoskeletal:antalgic         Pitocin

## 2024-08-05 ENCOUNTER — OFFICE VISIT (OUTPATIENT)
Dept: PHYSICAL THERAPY | Facility: CLINIC | Age: 54
End: 2024-08-05
Payer: COMMERCIAL

## 2024-08-05 DIAGNOSIS — M25.561 CHRONIC PAIN OF RIGHT KNEE: Primary | ICD-10-CM

## 2024-08-05 DIAGNOSIS — M22.2X1 PATELLOFEMORAL DISORDER OF RIGHT KNEE: ICD-10-CM

## 2024-08-05 DIAGNOSIS — M17.11 PRIMARY OSTEOARTHRITIS OF RIGHT KNEE: ICD-10-CM

## 2024-08-05 DIAGNOSIS — G89.29 CHRONIC PAIN OF RIGHT KNEE: Primary | ICD-10-CM

## 2024-08-05 PROCEDURE — 97140 MANUAL THERAPY 1/> REGIONS: CPT

## 2024-08-05 PROCEDURE — 97110 THERAPEUTIC EXERCISES: CPT

## 2024-08-05 PROCEDURE — 97112 NEUROMUSCULAR REEDUCATION: CPT

## 2024-08-05 NOTE — PROGRESS NOTES
"Daily Note     Today's date: 2024  Patient name: Marcus Roman  : 1970  MRN: [id number]  Referring provider: Robb Hoffmann PA-C  Dx:   Encounter Diagnosis     ICD-10-CM    1. Chronic pain of right knee  M25.561     G89.29       2. Primary osteoarthritis of right knee  M17.11       3. Patellofemoral disorder of right knee  M22.2X1           Start Time: 0845  Stop Time: 930  Total time in clinic (min): 45 minutes    Subjective: Pt notes slight improvement with her R knee pain.      Objective: See treatment diary below      Assessment: Tolerated treatment well. Patient would benefit from continued PT. PTA added hip hike for continued strengthening.       Plan: Continue per plan of care.      Precautions: Hx of L TKA 2022      Date  IE    FOTO 31 SC       Manuals        Passive hamstring, quad stretch 30\" 4x.  30\" 4x ea.  Ds_ gastroc Ds +gastroc Ds + gastroc           Neuro Re-Ed        Tandem walk-foam   NV 3 laps 5 laps   Side to side- foam   NV 3 laps 5 laps   Clamshell   NV 10x 5\" 10x 5\"   Hip hike 2\"     10x 3\"           Ther Ex        NuStep for strengthening and mobility NV L5 5 min 8m L5 8m L5 8m L5   SAQ/LAQ NV LAQ 2# 5\" 15x 2# 20x SAQ 2# 20x  ea 2# 20x ea   Heel slides NV 5\" 15x 15x 5\" 15x 5\" 15x 5\"   SLR NV 2x10 2/10 2/10 2/10   Standing gastroc stretch  10\" 10x home home home   Bridges c add  5\" 15x C add 15x 3\" 15x 3\" 20x 3\"                   Ther Activity                        Gait Training                Modalities        CP   10m 10m R 10m                         "

## 2024-08-08 ENCOUNTER — OFFICE VISIT (OUTPATIENT)
Dept: PHYSICAL THERAPY | Facility: CLINIC | Age: 54
End: 2024-08-08
Payer: COMMERCIAL

## 2024-08-08 DIAGNOSIS — M22.2X1 PATELLOFEMORAL DISORDER OF RIGHT KNEE: Primary | ICD-10-CM

## 2024-08-08 DIAGNOSIS — M25.561 CHRONIC PAIN OF RIGHT KNEE: ICD-10-CM

## 2024-08-08 DIAGNOSIS — M17.11 PRIMARY OSTEOARTHRITIS OF RIGHT KNEE: ICD-10-CM

## 2024-08-08 DIAGNOSIS — G89.29 CHRONIC PAIN OF RIGHT KNEE: ICD-10-CM

## 2024-08-08 PROCEDURE — 97112 NEUROMUSCULAR REEDUCATION: CPT

## 2024-08-08 PROCEDURE — 97110 THERAPEUTIC EXERCISES: CPT

## 2024-08-08 PROCEDURE — 97140 MANUAL THERAPY 1/> REGIONS: CPT

## 2024-08-08 NOTE — PROGRESS NOTES
"Daily Note     Today's date: 2024  Patient name: Marcus Roman  : 1970  MRN: [id number]  Referring provider: Robb Hoffmann PA-C  Dx:   Encounter Diagnosis     ICD-10-CM    1. Patellofemoral disorder of right knee  M22.2X1       2. Primary osteoarthritis of right knee  M17.11       3. Chronic pain of right knee  M25.561     G89.29           Start Time: 08  Stop Time: 930  Total time in clinic (min): 45 minutes    Subjective: I have a lot of pain over the front of my right knee today,. It was more sore yesterday.  My pain is about 7/10.       Objective: See treatment diary below      Assessment: Tolerated treatment well. Patient would benefit from continued PT   pt was able to use 3# today for some of her knee exercises and was able to do 20 reps of each. She reports having pain over the front of her right knee through out her session today.  We worked on her strength and flexibility today to help decrease some of the pain she has over the front of her knee.  Pt reports having tightness in her hamstring and quad today with passive stretching. She feels the pain ended about the same post exercises. We iced post for 10 min to her right knee.       Plan: Continue per plan of care.      Precautions: Hx of L TKA 2022      Date  8   FOTO        Manuals        Passive hamstring, quad stretch 30\" 4x.  30\" 4x ea.  Ds_ gastroc Ds +gastroc Ds + gastroc           Neuro Re-Ed        Tandem walk-foam 5 laps  NV 3 laps 5 laps   Side to side- foam 5 laps  NV 3 laps 5 laps   Clamshell 10 x 5\"  NV 10x 5\" 10x 5\"   Hip hike 2\" 10 x 3\"    10x 3\"           Ther Ex        NuStep for strengthening and mobility 8 min L 5 L5 5 min 8m L5 8m L5 8m L5   SAQ/LAQ 3#  20 x LAQ 2# 5\" 15x 2# 20x SAQ 2# 20x  ea 2# 20x ea   Heel slides 15 x 5\" 5\" 15x 15x 5\" 15x 5\" 15x 5\"   SLR NV20 x 2x10 2/10 2/10 2/10   Standing gastroc stretch home 10\" 10x home home home   Bridges c add 20 x3\" 5\" 15x C add 15x 3\" 15x 3\" 20x " "3\"                   Ther Activity                        Gait Training                Modalities        CP   10m 10m R 10m                           "

## 2024-08-13 ENCOUNTER — OFFICE VISIT (OUTPATIENT)
Dept: PHYSICAL THERAPY | Facility: CLINIC | Age: 54
End: 2024-08-13
Payer: COMMERCIAL

## 2024-08-13 DIAGNOSIS — M17.11 PRIMARY OSTEOARTHRITIS OF RIGHT KNEE: ICD-10-CM

## 2024-08-13 DIAGNOSIS — M22.2X1 PATELLOFEMORAL DISORDER OF RIGHT KNEE: Primary | ICD-10-CM

## 2024-08-13 DIAGNOSIS — G89.29 CHRONIC PAIN OF RIGHT KNEE: ICD-10-CM

## 2024-08-13 DIAGNOSIS — M25.561 CHRONIC PAIN OF RIGHT KNEE: ICD-10-CM

## 2024-08-13 PROCEDURE — 97110 THERAPEUTIC EXERCISES: CPT

## 2024-08-13 PROCEDURE — 97140 MANUAL THERAPY 1/> REGIONS: CPT

## 2024-08-13 PROCEDURE — 97112 NEUROMUSCULAR REEDUCATION: CPT

## 2024-08-13 NOTE — PROGRESS NOTES
"Daily Note     Today's date: 2024  Patient name: Marcus Roman  : 1970  MRN: [id number]  Referring provider: Robb Hoffmann PA-C  Dx:   Encounter Diagnosis     ICD-10-CM    1. Patellofemoral disorder of right knee  M22.2X1       2. Primary osteoarthritis of right knee  M17.11       3. Chronic pain of right knee  M25.561     G89.29           Start Time: 0845  Stop Time: 930  Total time in clinic (min): 45 minutes    Subjective: Pt notes some R knee soreness today.      Objective: See treatment diary below      Assessment: Tolerated treatment well. Patient would benefit from continued PT      Plan: Continue per plan of care.      Precautions: Hx of L TKA 2022      Date    FOTO        Manuals        Passive hamstring, quad stretch 30\" 4x.  Ds + gastroc, opp k-c Ds_ gastroc Ds +gastroc Ds + gastroc           Neuro Re-Ed        Tandem walk-foam 5 laps 5 laps NV 3 laps 5 laps   Side to side- foam 5 laps 5 laps NV 3 laps 5 laps   Clamshell 10 x 5\" 10x 10\" NV 10x 5\" 10x 5\"   Hip hike 2\" 10 x 3\" 10x 3\"   10x 3\"           Ther Ex        NuStep for strengthening and mobility 8 min L 5 8m L5 8m L5 8m L5 8m L5   LAQ 3#  20x ea 3# 20x ea 2# 20x SAQ 2# 20x  ea 2# 20x ea   Heel slides 15 x 5\" 5\" 15x 15x 5\" 15x 5\" 15x 5\"   SLR NV20 x 20x  2/10 2/10 2/10   Bridges c add 20 x3\" 20x 5\" C add 15x 3\" 15x 3\" 20x 3\"                   Ther Activity                        Gait Training                Modalities        CP  10m 10m 10m R 10m                             "

## 2024-08-16 ENCOUNTER — OFFICE VISIT (OUTPATIENT)
Dept: PHYSICAL THERAPY | Facility: CLINIC | Age: 54
End: 2024-08-16
Payer: COMMERCIAL

## 2024-08-16 DIAGNOSIS — M17.11 PRIMARY OSTEOARTHRITIS OF RIGHT KNEE: ICD-10-CM

## 2024-08-16 DIAGNOSIS — G89.29 CHRONIC PAIN OF RIGHT KNEE: Primary | ICD-10-CM

## 2024-08-16 DIAGNOSIS — M22.2X1 PATELLOFEMORAL DISORDER OF RIGHT KNEE: ICD-10-CM

## 2024-08-16 DIAGNOSIS — M25.561 CHRONIC PAIN OF RIGHT KNEE: Primary | ICD-10-CM

## 2024-08-16 PROCEDURE — 97112 NEUROMUSCULAR REEDUCATION: CPT

## 2024-08-16 PROCEDURE — 97110 THERAPEUTIC EXERCISES: CPT

## 2024-08-16 PROCEDURE — 97140 MANUAL THERAPY 1/> REGIONS: CPT

## 2024-08-16 NOTE — PROGRESS NOTES
"Daily Note     Today's date: 2024  Patient name: Marcus Roman  : 1970  MRN: [id number]  Referring provider: Robb Hoffmann PA-C  Dx:   Encounter Diagnosis     ICD-10-CM    1. Chronic pain of right knee  M25.561     G89.29       2. Primary osteoarthritis of right knee  M17.11       3. Patellofemoral disorder of right knee  M22.2X1           Start Time: 0800  Stop Time: 0845  Total time in clinic (min): 45 minutes    Subjective: My knees are painful today.       Objective: See treatment diary below      Assessment: Tolerated treatment well. Patient would benefit from continued PT  pt began therapy today with pain in both knees. She states that the pain in her left is very bad and is going to see the Dr to see what is wrong.  She had 108 degrees of active knee flexion on the right knee. She reports having pain through out her session today , but, was able to do all of her exercises. She states having pain with all activities at home and most with stairs and sit to stand.  Cold pack post was used to her right knee for 8 min.      Plan: Continue per plan of care.      Precautions: Hx of L TKA 2022      Date  8   FOTO        Manuals        Passive hamstring, quad stretch 30\" 4x.  Ds + gastroc, opp k-c JF_ gastroc Ds +gastroc Ds + gastroc           Neuro Re-Ed        Tandem walk-foam 5 laps 5 laps 5 laps 3 laps 5 laps   Side to side- foam 5 laps 5 laps 5 laps 3 laps 5 laps   Clamshell 10 x 5\" 10x 10\" 10 x 3\" 10x 5\" 10x 5\"   Hip hike 2\" 10 x 3\" 10x 3\" 10 x 3\"  10x 3\"           Ther Ex        NuStep for strengthening and mobility 8 min L 5 8m L5 8m L5 8m L5 8m L5   LAQ 3#  20x ea 3# 20x ea 3# 20x SAQ 2# 20x  ea 2# 20x ea   Heel slides 15 x 5\" 5\" 15x 15x 5\" 15x 5\" 15x 5\"   SLR NV20 x 20x  2/10 2/10 2/10   Bridges c add 20 x3\" 20x 5\" C add 20x 3\" 15x 3\" 20x 3\"                   Ther Activity                        Gait Training                Modalities        CP  10m 10m 10m R 10m     "

## 2024-08-20 ENCOUNTER — EVALUATION (OUTPATIENT)
Dept: PHYSICAL THERAPY | Facility: CLINIC | Age: 54
End: 2024-08-20
Payer: COMMERCIAL

## 2024-08-20 DIAGNOSIS — M22.2X1 PATELLOFEMORAL DISORDER OF RIGHT KNEE: ICD-10-CM

## 2024-08-20 DIAGNOSIS — M17.11 PRIMARY OSTEOARTHRITIS OF RIGHT KNEE: ICD-10-CM

## 2024-08-20 DIAGNOSIS — M25.561 CHRONIC PAIN OF RIGHT KNEE: Primary | ICD-10-CM

## 2024-08-20 DIAGNOSIS — G89.29 CHRONIC PAIN OF RIGHT KNEE: Primary | ICD-10-CM

## 2024-08-20 PROCEDURE — 97110 THERAPEUTIC EXERCISES: CPT | Performed by: PHYSICAL THERAPIST

## 2024-08-20 PROCEDURE — 97112 NEUROMUSCULAR REEDUCATION: CPT | Performed by: PHYSICAL THERAPIST

## 2024-08-20 NOTE — PROGRESS NOTES
PT Re-Evaluation     Today's date: 2024  Patient name: Marcus Roman  : 1970  MRN: [id number]  Referring provider: Robb Hoffmann PA-C  Dx:   Encounter Diagnosis     ICD-10-CM    1. Chronic pain of right knee  M25.561     G89.29       2. Primary osteoarthritis of right knee  M17.11       3. Patellofemoral disorder of right knee  M22.2X1           Start Time: 845  Stop Time: 930  Total time in clinic (min): 45 minutes    Assessment  Impairments: abnormal gait, abnormal or restricted ROM, activity intolerance, impaired physical strength, lacks appropriate home exercise program, pain with function and activity limitations  Symptom irritability: moderate    Assessment details: Patient has attended 9 physical therapy visits to date. She is demonstrating improvements to R knee rom but continues to lack strength with knee extension most notably. There is good patellar mobility noted but pain is noted in both a medial and lateral direction. There is tenderness noted over both the medial and lateral joint line with lateral greater than medial. Noted genu valgus is present to the R knee.   Understanding of Dx/Px/POC: good     Prognosis: good    Goals  STGs:  Patient will be independent with HEP in 1-2 visits - MET  Improve knee flexion to 130 degrees for improved tolerance with transfers and adls by 3-4 weeks. - Progressing  Improve knee strength to 3+/5 for improved tolerance with adls and transfers by 3-4 weeks. - MET  Improve knee extension to 0 degrees for improved tolerance with adls and transfers by 3-4 weeks. - Progressing  Standing and walking tolerance will improve to up to 20 minutes without limitation from the R knee by 3-4 weeks. - Not MET      LTGs:  Improve FOTO score from 31 To 52 Indicating improved tolerance with activities involving the LE by discharge. - Progressing  Improve knee strength and rom to wnl for improved tolerance with adls, home duties, and recreation by discharge. -  Progressing  Patient will be able to return to normal ambulation without deviation over all terrains without pain or limitation from the knee by discharge. - Progressing  Patient will be able to return to normal home duties without limitation from the knee by discharge. - Progressing      Plan  Patient would benefit from: skilled physical therapy  Planned modality interventions: cryotherapy and thermotherapy: hydrocollator packs    Planned therapy interventions: ADL retraining, balance/weight bearing training, flexibility, functional ROM exercises, gait training, home exercise program, joint mobilization, manual therapy, neuromuscular re-education, patient education, strengthening, stretching, therapeutic activities and therapeutic exercise    Frequency: 2-3x week  Duration in weeks: 4  Plan of Care beginning date: 8/20/2024  Plan of Care expiration date: 9/17/2024  Treatment plan discussed with: patient  Plan details: Patient informed that from this point forward, to ensure adherence to the aforementioned plan of care, all or some of the treatment may be performed and carried out by a Physical Therapy Assistant (PTA) with supervision from a licensed Physical Therapist (PT) in accordance with [facility] Physical Therapy Practice Act.  Patient will continue to benefit from skilled physical therapy to address the functional deficits that were identified during the evaluation today. We will continue to progress the therapy program to address these functional deficits and achieve the established goals.           Subjective Evaluation    History of Present Illness  Mechanism of injury: Patient presents to out patient physical therapy with chief c/o R knee pain. Patient reports onset of R knee pain over the past 5 months. She notes that she had a L knee replacement in November of 2022 and has been compensating due to continued pain in the L knee which has resulted in her placing more force through the R LE. She  feels that she is limited with her standing and walking tolerance along with driving secondary to increased R knee pain. She feels that when she is going up and down stairs she will at times lose her balance but denies any recent falls. She feels that when she is sitting for longer periods of time this will cause her pain in both knees when she tries to stand up. She is limited to sitting or standing in one position for about 10-15 minutes before she needs to change positions due to increased pain.     Update 2024:  Patient reports that she continues to experience pain in her R knee but feels that she is not able to recover from this pain due to the problems she is having with her L knee. She reports pain to the anterior/inferior aspect of the R knee just inferior to the R patella which is made worse with WB activities. She also finds that sleeping is difficult as she cannot find a comfortable position for both knees when trying to sleep.           Recurrent probem    Quality of life: good    Patient Goals  Patient goals for therapy: decreased pain, increased motion, increased strength and independence with ADLs/IADLs  Patient goal: Patient wishes to have less pain in her knee to allow for improved tolerance with her housework and tolerance for standing and walking.  Pain  Current pain ratin  At best pain ratin  At worst pain ratin  Location: R knee  Quality: discomfort, dull ache, throbbing, sharp and cramping  Relieving factors: medications, change in position and ice  Aggravating factors: walking, standing, sitting and stair climbing    Social Support  Lives with: alone    Employment status: not working  Treatments  Current treatment: physical therapy        Objective     Palpation     Right   Tenderness of the distal biceps femoris, distal semimembranosus, distal semitendinosus, lateral gastrocnemius and medial gastrocnemius.     Tenderness   Left Knee   Tenderness in the lateral joint line and  "medial joint line.     Right Knee   Tenderness in the inferior patella, ITB, lateral joint line and medial joint line.     Active Range of Motion   Left Knee   Flexion: 73 degrees   Extension: -6 degrees   Extensor la degrees     Right Knee   Flexion: 111 degrees   Extension: -5 degrees with pain  Extensor lag: 3 degrees     Passive Range of Motion   Left Knee   Extension: -2 degrees     Right Knee   Flexion: 85 degrees with pain  Extension: -2 degrees with pain    Mobility   Patellar Mobility:     Right Knee   WFL: medial and lateral  Hypomobile: superior and inferior     Strength/Myotome Testing     Left Hip   Planes of Motion   Flexion: 3  Extension: 4  Abduction: 4-  Adduction: 4-    Right Hip   Planes of Motion   Flexion: 3  Extension: 4  Abduction: 4-  Adduction: 4-    Left Knee   Flexion: 4-  Extension: 4-  Quadriceps contraction: good    Right Knee   Flexion: 4  Extension: 4-  Quadriceps contraction: good    Ambulation   Weight-Bearing Status   Assistive device used: single point cane    Observational Gait   Gait: antalgic   Increased right stance time. Decreased left stance time.   Left foot contact pattern: foot flat  Right foot contact pattern: heel to toe  Base of support: increased    Additional Observational Gait Details  Patient continues to ambulate with decreased cosmo and a lack of terminal knee extension to the R knee.       Flowsheet Rows      Flowsheet Row Most Recent Value   PT/OT G-Codes    Current Score 31   Projected Score 52                 Precautions: Hx of L TKA 2022      Date  Reassess    FOTO    49 SC    Manuals        Passive hamstring, quad stretch 30\" 4x.  Ds + gastroc, opp k-c JF_ gastroc  Ds + gastroc           Neuro Re-Ed        Tandem walk-foam 5 laps 5 laps 5 laps 5 laps 5 laps   Side to side- foam 5 laps 5 laps 5 laps 5 laps 5 laps   Clamshell 10 x 5\" 10x 10\" 10 x 3\"  10x 5\"   Hip hike 2\" 10 x 3\" 10x 3\" 10 x 3\" 5\" 15x 10x 3\"           Ther Ex      " "  NuStep for strengthening and mobility 8 min L 5 8m L5 8m L5 L5 8 min 8m L5   SAQ 3#  20x ea 3# 20x ea 3# 20x SAQ 3# 5\" 20x 2# 20x ea   Heel slides 15 x 5\" 5\" 15x 15x 5\"  15x 5\"   SLR NV20 x 20x  2/10 3# 3x10 2/10   Bridges c add 20 x3\" 20x 5\" C add 20x 3\" 5\" 20x 20x 3\"                   Ther Activity                        Gait Training                Modalities        CP  10m 10m NT 10m                   "

## 2024-08-20 NOTE — LETTER
2024    Robb Hoffmann PA-C  [address]  [address]  [facility] [address]    Patient: Marcus Roman   YOB: 1970   Date of Visit: 2024     Encounter Diagnosis     ICD-10-CM    1. Chronic pain of right knee  M25.561     G89.29       2. Primary osteoarthritis of right knee  M17.11       3. Patellofemoral disorder of right knee  M22.2X1           Dear Dr. Hoffmann:    Thank you for your recent referral of Marcus Roman. Please review the attached evaluation summary from Marcus's recent visit.     Please verify that you agree with the plan of care by signing the attached order.     If you have any questions or concerns, please do not hesitate to call.     I sincerely appreciate the opportunity to share in the care of one of your patients and hope to have another opportunity to work with you in the near future.       Sincerely,    Juancarlos Rojas, PT      Referring Provider:      I certify that I have read the below Plan of Care and certify the need for these services furnished under this plan of treatment while under my care.                    Robb Hoffmann PA-C  [address]  [address]  [facility] [address]  Via In Basket          PT Re-Evaluation     Today's date: 2024  Patient name: Marcus Roman  : 1970  MRN: [id number]  Referring provider: Robb Hoffmann PA-C  Dx:   Encounter Diagnosis     ICD-10-CM    1. Chronic pain of right knee  M25.561     G89.29       2. Primary osteoarthritis of right knee  M17.11       3. Patellofemoral disorder of right knee  M22.2X1           Start Time: 845  Stop Time: 930  Total time in clinic (min): 45 minutes    Assessment  Impairments: abnormal gait, abnormal or restricted ROM, activity intolerance, impaired physical strength, lacks appropriate home exercise program, pain with function and activity limitations  Symptom irritability: moderate    Assessment details: Patient has attended 9 physical therapy visits to date. She is  demonstrating improvements to R knee rom but continues to lack strength with knee extension most notably. There is good patellar mobility noted but pain is noted in both a medial and lateral direction. There is tenderness noted over both the medial and lateral joint line with lateral greater than medial. Noted genu valgus is present to the R knee.   Understanding of Dx/Px/POC: good     Prognosis: good    Goals  STGs:  Patient will be independent with HEP in 1-2 visits - MET  Improve knee flexion to 130 degrees for improved tolerance with transfers and adls by 3-4 weeks. - Progressing  Improve knee strength to 3+/5 for improved tolerance with adls and transfers by 3-4 weeks. - MET  Improve knee extension to 0 degrees for improved tolerance with adls and transfers by 3-4 weeks. - Progressing  Standing and walking tolerance will improve to up to 20 minutes without limitation from the R knee by 3-4 weeks. - Not MET      LTGs:  Improve FOTO score from 31 To 52 Indicating improved tolerance with activities involving the LE by discharge. - Progressing  Improve knee strength and rom to wnl for improved tolerance with adls, home duties, and recreation by discharge. - Progressing  Patient will be able to return to normal ambulation without deviation over all terrains without pain or limitation from the knee by discharge. - Progressing  Patient will be able to return to normal home duties without limitation from the knee by discharge. - Progressing      Plan  Patient would benefit from: skilled physical therapy  Planned modality interventions: cryotherapy and thermotherapy: hydrocollator packs    Planned therapy interventions: ADL retraining, balance/weight bearing training, flexibility, functional ROM exercises, gait training, home exercise program, joint mobilization, manual therapy, neuromuscular re-education, patient education, strengthening, stretching, therapeutic activities and therapeutic exercise    Frequency: 2-3x  week  Duration in weeks: 4  Plan of Care beginning date: 8/20/2024  Plan of Care expiration date: 9/17/2024  Treatment plan discussed with: patient  Plan details: Patient informed that from this point forward, to ensure adherence to the aforementioned plan of care, all or some of the treatment may be performed and carried out by a Physical Therapy Assistant (PTA) with supervision from a licensed Physical Therapist (PT) in accordance with [facility] Physical Therapy Practice Act.  Patient will continue to benefit from skilled physical therapy to address the functional deficits that were identified during the evaluation today. We will continue to progress the therapy program to address these functional deficits and achieve the established goals.           Subjective Evaluation    History of Present Illness  Mechanism of injury: Patient presents to out patient physical therapy with chief c/o R knee pain. Patient reports onset of R knee pain over the past 5 months. She notes that she had a L knee replacement in November of 2022 and has been compensating due to continued pain in the L knee which has resulted in her placing more force through the R LE. She feels that she is limited with her standing and walking tolerance along with driving secondary to increased R knee pain. She feels that when she is going up and down stairs she will at times lose her balance but denies any recent falls. She feels that when she is sitting for longer periods of time this will cause her pain in both knees when she tries to stand up. She is limited to sitting or standing in one position for about 10-15 minutes before she needs to change positions due to increased pain.     Update 8/20/2024:  Patient reports that she continues to experience pain in her R knee but feels that she is not able to recover from this pain due to the problems she is having with her L knee. She reports pain to the anterior/inferior aspect of the R knee just  inferior to the R patella which is made worse with WB activities. She also finds that sleeping is difficult as she cannot find a comfortable position for both knees when trying to sleep.           Recurrent probem    Quality of life: good    Patient Goals  Patient goals for therapy: decreased pain, increased motion, increased strength and independence with ADLs/IADLs  Patient goal: Patient wishes to have less pain in her knee to allow for improved tolerance with her housework and tolerance for standing and walking.  Pain  Current pain ratin  At best pain ratin  At worst pain ratin  Location: R knee  Quality: discomfort, dull ache, throbbing, sharp and cramping  Relieving factors: medications, change in position and ice  Aggravating factors: walking, standing, sitting and stair climbing    Social Support  Lives with: alone    Employment status: not working  Treatments  Current treatment: physical therapy        Objective     Palpation     Right   Tenderness of the distal biceps femoris, distal semimembranosus, distal semitendinosus, lateral gastrocnemius and medial gastrocnemius.     Tenderness   Left Knee   Tenderness in the lateral joint line and medial joint line.     Right Knee   Tenderness in the inferior patella, ITB, lateral joint line and medial joint line.     Active Range of Motion   Left Knee   Flexion: 73 degrees   Extension: -6 degrees   Extensor la degrees     Right Knee   Flexion: 111 degrees   Extension: -5 degrees with pain  Extensor lag: 3 degrees     Passive Range of Motion   Left Knee   Extension: -2 degrees     Right Knee   Flexion: 85 degrees with pain  Extension: -2 degrees with pain    Mobility   Patellar Mobility:     Right Knee   WFL: medial and lateral  Hypomobile: superior and inferior     Strength/Myotome Testing     Left Hip   Planes of Motion   Flexion: 3  Extension: 4  Abduction: 4-  Adduction: 4-    Right Hip   Planes of Motion   Flexion: 3  Extension: 4  Abduction:  "4-  Adduction: 4-    Left Knee   Flexion: 4-  Extension: 4-  Quadriceps contraction: good    Right Knee   Flexion: 4  Extension: 4-  Quadriceps contraction: good    Ambulation   Weight-Bearing Status   Assistive device used: single point cane    Observational Gait   Gait: antalgic   Increased right stance time. Decreased left stance time.   Left foot contact pattern: foot flat  Right foot contact pattern: heel to toe  Base of support: increased    Additional Observational Gait Details  Patient continues to ambulate with decreased cosmo and a lack of terminal knee extension to the R knee.       Flowsheet Rows      Flowsheet Row Most Recent Value   PT/OT G-Codes    Current Score 31   Projected Score 52                 Precautions: Hx of L TKA 11/2022      Date 8/8 8/13 8/16 8/20 Reassess 8/5   FOTO    49 SC    Manuals        Passive hamstring, quad stretch 30\" 4x.  Ds + gastroc, opp k-c JF_ gastroc  Ds + gastroc           Neuro Re-Ed        Tandem walk-foam 5 laps 5 laps 5 laps 5 laps 5 laps   Side to side- foam 5 laps 5 laps 5 laps 5 laps 5 laps   Clamshell 10 x 5\" 10x 10\" 10 x 3\"  10x 5\"   Hip hike 2\" 10 x 3\" 10x 3\" 10 x 3\" 5\" 15x 10x 3\"           Ther Ex        NuStep for strengthening and mobility 8 min L 5 8m L5 8m L5 L5 8 min 8m L5   SAQ 3#  20x ea 3# 20x ea 3# 20x SAQ 3# 5\" 20x 2# 20x ea   Heel slides 15 x 5\" 5\" 15x 15x 5\"  15x 5\"   SLR NV20 x 20x  2/10 3# 3x10 2/10   Bridges c add 20 x3\" 20x 5\" C add 20x 3\" 5\" 20x 20x 3\"                   Ther Activity                        Gait Training                Modalities        CP  10m 10m NT 10m                                   "

## 2024-08-21 ENCOUNTER — TELEPHONE (OUTPATIENT)
Age: 54
End: 2024-08-21

## 2024-08-21 NOTE — TELEPHONE ENCOUNTER
Caller: Patient    Doctor/Office: na    Call regarding :  returned call     Call was transferred to: S[facility]

## 2024-08-23 ENCOUNTER — OFFICE VISIT (OUTPATIENT)
Dept: PHYSICAL THERAPY | Facility: CLINIC | Age: 54
End: 2024-08-23
Payer: COMMERCIAL

## 2024-08-23 DIAGNOSIS — M22.2X1 PATELLOFEMORAL DISORDER OF RIGHT KNEE: ICD-10-CM

## 2024-08-23 DIAGNOSIS — M25.561 CHRONIC PAIN OF RIGHT KNEE: Primary | ICD-10-CM

## 2024-08-23 DIAGNOSIS — G89.29 CHRONIC PAIN OF RIGHT KNEE: Primary | ICD-10-CM

## 2024-08-23 DIAGNOSIS — M17.11 PRIMARY OSTEOARTHRITIS OF RIGHT KNEE: ICD-10-CM

## 2024-08-23 PROCEDURE — 97110 THERAPEUTIC EXERCISES: CPT

## 2024-08-23 PROCEDURE — 97140 MANUAL THERAPY 1/> REGIONS: CPT

## 2024-08-23 PROCEDURE — 97112 NEUROMUSCULAR REEDUCATION: CPT

## 2024-08-23 NOTE — PROGRESS NOTES
"Daily Note     Today's date: 2024  Patient name: Marcus Roman  : 1970  MRN: [id number]  Referring provider: Robb Hoffmann PA-C  Dx:   Encounter Diagnosis     ICD-10-CM    1. Chronic pain of right knee  M25.561     G89.29       2. Primary osteoarthritis of right knee  M17.11       3. Patellofemoral disorder of right knee  M22.2X1                      Subjective: Pt  notes only mild increase in her R knee strength.      Objective: See treatment diary below      Assessment: Tolerated treatment well. Patient demonstrated fatigue post treatment. She completes full program despite c/o pain with same.      Plan: Continue per plan of care.      Precautions: Hx of L TKA 2022      Date  Reassess    FOTO    49 SC    Manuals        Passive hamstring, quad stretch 30\" 4x.  Ds + gastroc, opp k-c JF_ gastroc  Ds + gastroc, opp k-c           Neuro Re-Ed        Tandem walk-foam 5 laps 5 laps 5 laps 5 laps 5 laps F/B    Side to side- foam 5 laps 5 laps 5 laps 5 laps 5 laps   Clamshell 10 x 5\" 10x 10\" 10 x 3\"  10x 5\"   Hip hike 2\" 10 x 3\" 10x 3\" 10 x 3\" 5\" 15x 10x 3\"           Ther Ex        NuStep for strengthening and mobility 8 min L 5 8m L5 8m L5 L5 8 min 8m L5   SAQ 3#  20x ea 3# 20x ea 3# 20x SAQ 3# 5\" 20x 3# 20x ea 5\"   Heel slides 15 x 5\" 5\" 15x 15x 5\"     SLR NV20 x 20x  2/10 3# 3x10 3# 2/10   Bridges c add 20 x3\" 20x 5\" C add 20x 3\" 5\" 20x 20x 5\"                   Ther Activity                        Gait Training                Modalities        CP  10m 10m NT defer                  "

## 2024-08-28 ENCOUNTER — OFFICE VISIT (OUTPATIENT)
Dept: PHYSICAL THERAPY | Facility: CLINIC | Age: 54
End: 2024-08-28
Payer: COMMERCIAL

## 2024-08-28 DIAGNOSIS — M22.2X1 PATELLOFEMORAL DISORDER OF RIGHT KNEE: ICD-10-CM

## 2024-08-28 DIAGNOSIS — M25.561 CHRONIC PAIN OF RIGHT KNEE: Primary | ICD-10-CM

## 2024-08-28 DIAGNOSIS — M17.11 PRIMARY OSTEOARTHRITIS OF RIGHT KNEE: ICD-10-CM

## 2024-08-28 DIAGNOSIS — G89.29 CHRONIC PAIN OF RIGHT KNEE: Primary | ICD-10-CM

## 2024-08-28 PROCEDURE — 97140 MANUAL THERAPY 1/> REGIONS: CPT

## 2024-08-28 PROCEDURE — 97112 NEUROMUSCULAR REEDUCATION: CPT

## 2024-08-28 PROCEDURE — 97110 THERAPEUTIC EXERCISES: CPT

## 2024-08-28 NOTE — PROGRESS NOTES
"Daily Note     Today's date: 2024  Patient name: Marcus Roman  : 1970  MRN: [id number]  Referring provider: Robb Hoffmann PA-C  Dx:   Encounter Diagnosis     ICD-10-CM    1. Chronic pain of right knee  M25.561     G89.29       2. Primary osteoarthritis of right knee  M17.11       3. Patellofemoral disorder of right knee  M22.2X1           Start Time: 0845  Stop Time: 930  Total time in clinic (min): 45 minutes    Subjective: Pt reports good and bad days with her R knee.       Objective: See treatment diary below      Assessment: Tolerated treatment well. Patient exhibited good technique with therapeutic exercises. PTA added hook lying ABD c L4 theraband. She demonstrates good chago to TE with some relief of her sx.      Plan: Continue per plan of care.      Precautions: Hx of L TKA 2022      Date  Reassess    FOTO    49 SC    Manuals        Passive hamstring, quad stretch Ds+ gastroc,hs,opp k-c Ds + gastroc, opp k-c JF_ gastroc  Ds + gastroc, opp k-c           Neuro Re-Ed        Tandem walk-foam 5 laps F/B 5 laps 5 laps 5 laps 5 laps F/B    Side to side- foam 5 laps 5 laps 5 laps 5 laps 5 laps   Clamshell 10 x 5\" 10x 10\" 10 x 3\"  10x 5\"   Hip hike 2\" 10 x 3\" 10x 3\" 10 x 3\" 5\" 15x 10x 3\"           Ther Ex        NuStep for strengthening and mobility 8m L5 8m L5 8m L5 L5 8 min 8m L5   SAQ 3#  20x ea 3# 20x ea 3# 20x SAQ 3# 5\" 20x 3# 20x ea 5\"   Heel slides  5\" 15x 15x 5\"     SLR 3# 2/10 20x  2/10 3# 3x10 3# 2/10   Bridges c add 20 x5\"  20x 5\" C add 20x 3\" 5\" 20x 20x 5\"   Hooklying tb ABD L4 15x 3\"               Ther Activity                        Gait Training                Modalities        CP  10m 10m NT defer                    "

## 2024-08-30 ENCOUNTER — OFFICE VISIT (OUTPATIENT)
Dept: PHYSICAL THERAPY | Facility: CLINIC | Age: 54
End: 2024-08-30
Payer: COMMERCIAL

## 2024-08-30 DIAGNOSIS — M22.2X1 PATELLOFEMORAL DISORDER OF RIGHT KNEE: ICD-10-CM

## 2024-08-30 DIAGNOSIS — G89.29 CHRONIC PAIN OF RIGHT KNEE: Primary | ICD-10-CM

## 2024-08-30 DIAGNOSIS — M25.561 CHRONIC PAIN OF RIGHT KNEE: Primary | ICD-10-CM

## 2024-08-30 DIAGNOSIS — M17.11 PRIMARY OSTEOARTHRITIS OF RIGHT KNEE: ICD-10-CM

## 2024-08-30 PROCEDURE — 97140 MANUAL THERAPY 1/> REGIONS: CPT

## 2024-08-30 PROCEDURE — 97110 THERAPEUTIC EXERCISES: CPT

## 2024-08-30 PROCEDURE — 97112 NEUROMUSCULAR REEDUCATION: CPT

## 2024-08-30 NOTE — PROGRESS NOTES
"Daily Note     Today's date: 2024  Patient name: Marcus Roman  : 1970  MRN: [id number]  Referring provider: Robb Hoffmann PA-C  Dx:   Encounter Diagnosis     ICD-10-CM    1. Chronic pain of right knee  M25.561     G89.29       2. Primary osteoarthritis of right knee  M17.11       3. Patellofemoral disorder of right knee  M22.2X1           Start Time: 0845  Stop Time: 930  Total time in clinic (min): 45 minutes    Subjective: Pt reports less pain with her R knee.       Objective: See treatment diary below      Assessment: Tolerated treatment well. Patient exhibited good technique with therapeutic exercises,PTA provided instruction for seated gastroc stretch c strap at home/handout provided for same. Added SLB with min c/o.      Plan: Continue per plan of care.      Precautions: Hx of L TKA 2022      Date  Reassess    FOTO    49 SC    Manuals        Passive hamstring, quad stretch Ds+ gastroc,hs,opp k-c Ds + gastroc, opp k-c, hs  JF_ gastroc  Ds + gastroc, opp k-c           Neuro Re-Ed        Tandem walk-foam 5 laps F/B 5 laps 5 laps 5 laps 5 laps F/B    Side to side- foam 5 laps 5 laps 5 laps 5 laps 5 laps   Clamshell 10 x 5\" 10x  5\" 10 x 3\"  10x 5\"   Hip hike 2\" 10 x 3\" 10x 3\" 10 x 3\" 5\" 15x 10x 3\"   SLB-foam  4x 10\"      Ther Ex        NuStep for strengthening and mobility 8m L5 8m L5 8m L5 L5 8 min 8m L5   SAQ 3#  20x ea 3# 2/15 3# 20x SAQ 3# 5\" 20x 3# 20x ea 5\"   Heel slides   15x 5\"     SLR 3# 2/10 3# 2/10 2/10 3# 3x10 3# 2/10   Bridges c add 20 x5\"  20x 5\" C add 20x 3\" 5\" 20x 20x 5\"   Hooklying tb ABD L4 15x 3\" L4 15x 3\"      Hamstring curl  NV      LAQ  NV      Ther Activity                        Gait Training                Modalities        CP  defer 10m NT defer                      "

## 2024-09-04 ENCOUNTER — APPOINTMENT (OUTPATIENT)
Dept: PHYSICAL THERAPY | Facility: CLINIC | Age: 54
End: 2024-09-04
Payer: COMMERCIAL

## 2024-09-06 ENCOUNTER — APPOINTMENT (OUTPATIENT)
Dept: PHYSICAL THERAPY | Facility: CLINIC | Age: 54
End: 2024-09-06
Payer: COMMERCIAL

## 2024-09-11 ENCOUNTER — OFFICE VISIT (OUTPATIENT)
Dept: PHYSICAL THERAPY | Facility: CLINIC | Age: 54
End: 2024-09-11
Payer: COMMERCIAL

## 2024-09-11 DIAGNOSIS — M17.11 PRIMARY OSTEOARTHRITIS OF RIGHT KNEE: ICD-10-CM

## 2024-09-11 DIAGNOSIS — M22.2X1 PATELLOFEMORAL DISORDER OF RIGHT KNEE: ICD-10-CM

## 2024-09-11 DIAGNOSIS — G89.29 CHRONIC PAIN OF RIGHT KNEE: Primary | ICD-10-CM

## 2024-09-11 DIAGNOSIS — M25.561 CHRONIC PAIN OF RIGHT KNEE: Primary | ICD-10-CM

## 2024-09-11 PROCEDURE — 97140 MANUAL THERAPY 1/> REGIONS: CPT

## 2024-09-11 PROCEDURE — 97112 NEUROMUSCULAR REEDUCATION: CPT

## 2024-09-11 PROCEDURE — 97110 THERAPEUTIC EXERCISES: CPT

## 2024-09-11 NOTE — PROGRESS NOTES
"Daily Note     Today's date: 2024  Patient name: Marcus Roman  : 1970  MRN: [id number]  Referring provider: Robb Hoffmann PA-C  Dx:   Encounter Diagnosis     ICD-10-CM    1. Chronic pain of right knee  M25.561     G89.29       2. Primary osteoarthritis of right knee  M17.11       3. Patellofemoral disorder of right knee  M22.2X1           Start Time: 1015  Stop Time: 1110  Total time in clinic (min): 55 minutes    Subjective:  I have some pain over the front of my right knee today.       Objective: See treatment diary below      Assessment: Tolerated treatment well. Patient would benefit from continued PT   pt began some new exercises today with some mild soreness at times in her right knee.  She also reports having some left hamstring pain after doing her clamshells today. She felt better when she got up and walked around some to loosen her leg up.  She was tight in her hamstrings and gastrocs today.  We continue to work on her strength and flexibility to help decrease pain and improve her gait.       Plan: Continue per plan of care.      Precautions: Hx of L TKA 2022      Date  Reassess    FOTO   44 JF 49 SC    Manuals        Passive hamstring, quad stretch Ds+ gastroc,hs,opp k-c Ds + gastroc, opp k-c, hs  JF_ gastroc  Ds + gastroc, opp k-c           Neuro Re-Ed        Tandem walk-foam 5 laps F/B 5 laps 5 laps 5 laps 5 laps F/B    Side to side- foam 5 laps 5 laps 5 laps 5 laps 5 laps   Clamshell 10 x 5\" 10x  5\" 10 x 5\"  10x 5\"   Hip hike 2\" 10 x 3\" 10x 3\" 10 x 3\" 5\" 15x 10x 3\"   SLB-foam  4x 10\"      Ther Ex        NuStep for strengthening and mobility 8m L5 8m L5 8m L6 L5 8 min 8m L5   SAQ 3#  20x ea 3# 2/15  3# 5\" 20x 3# 20x ea 5\"   Heel slides        SLR 3# 2/10 3# 2/10 2/10   3#  3# 3x10 3# 2/10   Bridges c add 20 x5\"  20x 5\" C add 20x 5\" 5\" 20x 20x 5\"   Hooklying tb ABD L4 15x 3\" L4 15x 3\" L 4 15 x 3\"     Hamstring curl  NV 15#  20 x     LAQ  NV 50 #  20 x   "   Ther Activity                        Gait Training                Modalities        CP  defer 10m NT defer

## 2024-09-11 NOTE — PROGRESS NOTES
Ambulatory Visit  Name: Marucs Roman      : 1970      MRN: [id number]  Encounter Provider: Lisa Ray DO  Encounter Date: 2024   Encounter department: [facility] [place]    Assessment & Plan  Primary generalized (osteo)arthritis  Patient is a 53 year old female presenting for further investigation for bilateral knee pain. Pain has been worse since L TKA. Patient reports that the pain is constant and worse with use, going up/down stairs, or prolonged sitting in one position. Denies prolonged AM stiffness, joint swelling, erythema or warmth. No synovitis, dactylitis, enthesitis noted on exam. OA changes noted on exam. Previous work up shows negative RF and CCP. Imaging reviewed which consistent with osteoarthritis. Patient does have a pruritic rash on the LE which has been present for many years and thought to be dermatitis by Dermatology, however can benefit from re-evaluation to rule out psoriasis. Currently, no objective evidence for an inflammatory arthritis such as a seronegative spondyloarthritis.    - Recommend continuing PT   - Continue to follow with pain management   - Follow up if symptoms change        Right knee pain, unspecified chronicity    Orders:    Ambulatory Referral to Rheumatology    Rash and nonspecific skin eruption    Orders:    Ambulatory Referral to Rheumatology    Ambulatory Referral to Dermatology; Future    Status post left knee replacement           History of Present Illness     Marcus Roman is a 53 y.o. female with a PMH of OA s/p L TKA who presents for further evaluation of right knee pain.     History obtained from : patient  [employer] Interpretor #[id number] was utilized.     Patient reports that she has been dealing with pain in her knees for over 2 years now. Reports everything became significantly worse after her L TKA. Reports she has constant pain in the left knee which has now progressed to the right side. Reports that the pain never  goes away and worse with walking or prolonged sitting. Reports difficulty going up and down stairs. States that she has tried multiple pain medications including Ibuprofen and Tramadol. Reports that they help with pain control for a short period of time before coming back. Patient states that she is currently working with PT and it is making her knee pain worse. Patient reports that she is scheduled for a nerve block of the left knee to help with pain control.     Patient denies prolonged AM stiffness. Reports generalized swelling of the lower extremities but no joint swelling. Denies erythema or warmth. Reports some pain in the PIPs when making a fist, but no swelling.    Patient also reports a rash on her LEs which she states has been there for several years. States that the rash is pruritic. Remembers seeing Dermatology several years ago and was prescribed topical steroids. Per chart review, dermatitis, not thought to be psoriasis. Patient reports she has never been diagnosed with psoriasis. Patient is interested in establishing care with Dermatology again.     Social hx: has 3 children, denies alcohol and tobacco use     Review of Systems    (+) 1 miscarriage at 4 months    Denies:  Fever  Oral/nasal/genital ulcers  Dry eyes, dry mouth  Vision changes  Dysphagia/odynophagia  CP  COLEMAN  SOB at rest  Pleurisy  Stomach pain  Constipation/bloating  Hematochezia  Gross hematuria  Numbness/tingling  Muscle weakness   Hx of VTE  Dactylitis  Raynaud's  Joint issues other than noted above    Past Medical History:   Diagnosis Date    Anxiety     Depression     GERD (gastroesophageal reflux disease)     Hyperlipidemia     Hypertension      Current Outpatient Medications on File Prior to Visit   Medication Sig Dispense Refill    amlodipine-olmesartan (SHAW) 10-40 MG       hydroCHLOROthiazide 25 mg tablet       acetaminophen (TYLENOL) 500 mg tablet Take 500 mg by mouth every 6 (six) hours as needed (Patient not taking:  "Reported on 7/9/2024)      amLODIPine (NORVASC) 5 mg tablet  (Patient not taking: Reported on 7/26/2024)      buPROPion (WELLBUTRIN SR) 150 mg 12 hr tablet Take 150 mg by mouth 2 (two) times a day      Cholecalciferol (Vitamin D3) 1.25 MG (29774 UT) CAPS       Diclofenac Sodium (VOLTAREN) 1 % Apply 1 application topically      furosemide (LASIX) 20 mg tablet Take 20 mg by mouth      losartan (COZAAR) 100 MG tablet Take 1 tablet (100 mg total) by mouth daily (Patient not taking: Reported on 9/12/2023) 90 tablet 1    losartan-hydrochlorothiazide (HYZAAR) 100-25 MG per tablet       metoprolol succinate (TOPROL-XL) 50 mg 24 hr tablet Take 50 mg by mouth daily (Patient not taking: Reported on 7/9/2024)      metoprolol tartrate (LOPRESSOR) 50 mg tablet Take 50 mg by mouth 2 (two) times a day (Patient not taking: Reported on 7/9/2024)      omeprazole (PriLOSEC OTC) 20 MG tablet Take 1 tablet (20 mg total) by mouth daily (Patient not taking: Reported on 7/9/2024) 90 tablet 1    rosuvastatin (CRESTOR) 20 MG tablet  (Patient not taking: Reported on 7/9/2024)      rosuvastatin (CRESTOR) 40 MG tablet       traMADol (Ultram) 50 mg tablet Take 1 tablet (50 mg total) by mouth every 6 (six) hours as needed for moderate pain 120 tablet 2    Wegovy 0.25 MG/0.5ML Inject 0.25 mg under the skin once a week       No current facility-administered medications on file prior to visit.       Objective     /80   Ht 5' 7\" (1.702 m)   BMI 33.77 kg/m²       Physical Exam  General appearance: normal appearing, no acute distress  Skin: normal, erythematous plaques noted on LE   HEENT: normal, moist oropharynx, no nasal or oral ulcers  Lymph nodes: no palpable adenopathy  Lungs: normal respiratory effort, comfortable on room air, lungs clear to auscultation b/l   Heart: normal heart sounds, normal rate, normal rhythm,  Abdomen: soft, normal bowel sounds, no tenderness  Neurologic: no obvious neurological deficits   Extremities: (+) pitting " edema of b/l LEs, warm and well perfused     Musculoskeletal Exam:   - Observation: left knee surgical present, (+) Heberden's and Matias's nodes, (+) hammer toe   - Palpation: no joint tenderness  - Synovitis: absent  - Joint effusions: absent  - ROM: limited ROM of left knee with extension and flexion   - Muscle Strength: 5/5 throughout       I have independently reviewed the following labs/images and interpret them as follows.    RF negative   CCP negative   SAL negative     Xray R knee 7/9/2024  Small joint effusion and  mild osteoarthritis with osteophytes.         Lisa Ray DO, CCD, [facility]

## 2024-09-13 ENCOUNTER — OFFICE VISIT (OUTPATIENT)
Dept: PHYSICAL THERAPY | Facility: CLINIC | Age: 54
End: 2024-09-13
Payer: COMMERCIAL

## 2024-09-13 DIAGNOSIS — M22.2X1 PATELLOFEMORAL DISORDER OF RIGHT KNEE: Primary | ICD-10-CM

## 2024-09-13 DIAGNOSIS — M17.11 PRIMARY OSTEOARTHRITIS OF RIGHT KNEE: ICD-10-CM

## 2024-09-13 DIAGNOSIS — M25.561 CHRONIC PAIN OF RIGHT KNEE: ICD-10-CM

## 2024-09-13 DIAGNOSIS — G89.29 CHRONIC PAIN OF RIGHT KNEE: ICD-10-CM

## 2024-09-13 PROCEDURE — 97112 NEUROMUSCULAR REEDUCATION: CPT

## 2024-09-13 PROCEDURE — 97110 THERAPEUTIC EXERCISES: CPT

## 2024-09-13 PROCEDURE — 97140 MANUAL THERAPY 1/> REGIONS: CPT

## 2024-09-13 NOTE — PROGRESS NOTES
"Daily Note     Today's date: 2024  Patient name: Marcus Roman  : 1970  MRN: [id number]  Referring provider: Robb Hoffmann PA-C  Dx:   Encounter Diagnosis     ICD-10-CM    1. Patellofemoral disorder of right knee  M22.2X1       2. Primary osteoarthritis of right knee  M17.11       3. Chronic pain of right knee  M25.561     G89.29           Start Time: 1100  Stop Time: 1155  Total time in clinic (min): 55 minutes    Subjective: My right knee bothers me some, its not bad,. I am only going up stairs using my right leg first. I can't go up with the left leg due to pain.       Objective: See treatment diary below      Assessment: Tolerated treatment well. Patient would benefit from continued PT   pt reports having very little pain in her right knee with the exercises today , but, did reports more pain in both hamstrings.  She states that the left leg gives her more pain than the right , pt reports that her left knee bothers her more than her right. She had tightness noted in both hamstrings today . We will increase her program as able.       Plan: Continue per plan of care.      Precautions: Hx of L TKA 2022      Date    FOTO   44 JF     Manuals        Passive hamstring, quad stretch Ds+ gastroc,hs,opp k-c Ds + gastroc, opp k-c, hs  JF_ gastroc JF   8 min Ds + gastroc, opp k-c           Neuro Re-Ed        Tandem walk-foam 5 laps F/B 5 laps 5 laps 5 laps 5 laps F/B    Side to side- foam 5 laps 5 laps 5 laps 5 laps 5 laps   Clamshell 10 x 5\" 10x  5\" 10 x 5\" 10x 5 \"   10x 5\"   Hip hike 2\" 10 x 3\" 10x 3\" 10 x 3\" 5\" 15x 10x 3\"   SLB-foam  4x 10\"  10\" 5x    Ther Ex        NuStep for strengthening and mobility 8m L5 8m L5 8m L6 L5 8 min 8m L5   SAQ 3#  20x ea 3# 2/15  3# 5\" 20x 3# 20x ea 5\"   Heel slides        SLR 3# 2/10 3# 2/10 2/10   3#  3# 3x10 3# 2/10   Bridges c add 20 x5\"  20x 5\" C add 20x 5\" 5\" 20x 20x 5\"   Hooklying tb ABD L4 15x 3\" L4 15x 3\" L 4 15 x 3\" L 4 20 x 3 \"  "   Hamstring curl  NV 50#  20 x 50 #     LAQ  NV 15 #  20 x 15#  20 x    Ther Activity                        Gait Training                Modalities        CP  defer 10m NT defer

## 2024-09-16 ENCOUNTER — CONSULT (OUTPATIENT)
Dept: RHEUMATOLOGY | Facility: CLINIC | Age: 54
End: 2024-09-16
Payer: COMMERCIAL

## 2024-09-16 VITALS — SYSTOLIC BLOOD PRESSURE: 124 MMHG | BODY MASS INDEX: 33.77 KG/M2 | HEIGHT: 67 IN | DIASTOLIC BLOOD PRESSURE: 80 MMHG

## 2024-09-16 DIAGNOSIS — M25.561 RIGHT KNEE PAIN, UNSPECIFIED CHRONICITY: Primary | ICD-10-CM

## 2024-09-16 DIAGNOSIS — M15.0 PRIMARY GENERALIZED (OSTEO)ARTHRITIS: ICD-10-CM

## 2024-09-16 DIAGNOSIS — Z96.652 STATUS POST LEFT KNEE REPLACEMENT: ICD-10-CM

## 2024-09-16 DIAGNOSIS — R21 RASH AND NONSPECIFIC SKIN ERUPTION: ICD-10-CM

## 2024-09-16 PROCEDURE — 99244 OFF/OP CNSLTJ NEW/EST MOD 40: CPT | Performed by: STUDENT IN AN ORGANIZED HEALTH CARE EDUCATION/TRAINING PROGRAM

## 2024-09-16 RX ORDER — AMLODIPINE AND OLMESARTAN MEDOXOMIL 10; 40 MG/1; MG/1
TABLET ORAL
COMMUNITY
Start: 2024-08-12

## 2024-09-16 RX ORDER — HYDROCHLOROTHIAZIDE 25 MG/1
TABLET ORAL
COMMUNITY
Start: 2024-08-12

## 2024-09-20 ENCOUNTER — EVALUATION (OUTPATIENT)
Dept: PHYSICAL THERAPY | Facility: CLINIC | Age: 54
End: 2024-09-20
Payer: COMMERCIAL

## 2024-09-20 DIAGNOSIS — M17.11 PRIMARY OSTEOARTHRITIS OF RIGHT KNEE: ICD-10-CM

## 2024-09-20 DIAGNOSIS — M25.561 CHRONIC PAIN OF RIGHT KNEE: ICD-10-CM

## 2024-09-20 DIAGNOSIS — M22.2X1 PATELLOFEMORAL DISORDER OF RIGHT KNEE: Primary | ICD-10-CM

## 2024-09-20 DIAGNOSIS — G89.29 CHRONIC PAIN OF RIGHT KNEE: ICD-10-CM

## 2024-09-20 PROCEDURE — 97110 THERAPEUTIC EXERCISES: CPT

## 2024-09-20 NOTE — PROGRESS NOTES
PT Re-Evaluation     Today's date: 2024  Patient name: Marcus Roman  : 1970  MRN: [id number]  Referring provider: Robb Hoffmann PA-C  Dx:   Encounter Diagnosis     ICD-10-CM    1. Patellofemoral disorder of right knee  M22.2X1       2. Primary osteoarthritis of right knee  M17.11       3. Chronic pain of right knee  M25.561     G89.29             Start Time: 0850  Stop Time: 0950  Total time in clinic (min): 60 minutes    Assessment  Impairments: abnormal gait, abnormal or restricted ROM, activity intolerance, impaired physical strength, lacks appropriate home exercise program, pain with function and activity limitations  Symptom irritability: moderate    Assessment details: Patient has attended 15 physical therapy visits to date. She continues to demonstrates slow improvements to Joo knee ROM and strength. She continues to have good patellar mobility with minimal pain today. There is continued tenderness noted over both the medial and lateral joint line with lateral greater than medial.   Understanding of Dx/Px/POC: good     Prognosis: good    Goals  STGs:  Patient will be independent with HEP in 1-2 visits - MET  Improve knee flexion to 130 degrees for improved tolerance with transfers and adls by 3-4 weeks. - Progressing  Improve knee strength to 3+/5 for improved tolerance with adls and transfers by 3-4 weeks. - MET  Improve knee extension to 0 degrees for improved tolerance with adls and transfers by 3-4 weeks. - Progressing  Standing and walking tolerance will improve to up to 20 minutes without limitation from the R knee by 3-4 weeks. - Not MET      LTGs:  Improve FOTO score from 31 To 52 Indicating improved tolerance with activities involving the LE by discharge. - Progressing  Improve knee strength and rom to wnl for improved tolerance with adls, home duties, and recreation by discharge. - Progressing  Patient will be able to return to normal ambulation without deviation over all  terrains without pain or limitation from the knee by discharge. - Progressing  Patient will be able to return to normal home duties without limitation from the knee by discharge. - Progressing      Plan  Patient would benefit from: skilled physical therapy  Planned modality interventions: cryotherapy and thermotherapy: hydrocollator packs    Planned therapy interventions: ADL retraining, balance/weight bearing training, flexibility, functional ROM exercises, gait training, home exercise program, joint mobilization, manual therapy, neuromuscular re-education, patient education, strengthening, stretching, therapeutic activities and therapeutic exercise    Frequency: 2-3x week  Duration in weeks: 4  Plan of Care beginning date: 9/20/2024  Plan of Care expiration date: 10/18/2024  Treatment plan discussed with: patient  Plan details: Patient informed that from this point forward, to ensure adherence to the aforementioned plan of care, all or some of the treatment may be performed and carried out by a Physical Therapy Assistant (PTA) with supervision from a licensed Physical Therapist (PT) in accordance with [facility] Physical Therapy Practice Act.  Patient will continue to benefit from skilled physical therapy to address the functional deficits that were identified during the evaluation today. We will continue to progress the therapy program to address these functional deficits and achieve the established goals.           Subjective Evaluation    History of Present Illness  Mechanism of injury: Patient presents to out patient physical therapy with chief c/o R knee pain. Patient reports onset of R knee pain over the past 5 months. She notes that she had a L knee replacement in November of 2022 and has been compensating due to continued pain in the L knee which has resulted in her placing more force through the R LE. She feels that she is limited with her standing and walking tolerance along with driving  secondary to increased R knee pain. She feels that when she is going up and down stairs she will at times lose her balance but denies any recent falls. She feels that when she is sitting for longer periods of time this will cause her pain in both knees when she tries to stand up. She is limited to sitting or standing in one position for about 10-15 minutes before she needs to change positions due to increased pain.     Update 2024:  Patient reports that she continues to experience pain in her R knee but feels that she is not able to recover from this pain due to the problems she is having with her L knee. She reports pain to the anterior/inferior aspect of the R knee just inferior to the R patella which is made worse with WB activities. She also finds that sleeping is difficult as she cannot find a comfortable position for both knees when trying to sleep.     Update 2024:  The patient states that she continues to have pain in her R and L knee. She states that she feels like the pain is getting worse in her L knee. She states that she is able to ambulate without an AD most of the time. She states that she will use her SPC occasionally.             Recurrent probem    Quality of life: good    Patient Goals  Patient goals for therapy: decreased pain, increased motion, increased strength and independence with ADLs/IADLs  Patient goal: Patient wishes to have less pain in her knee to allow for improved tolerance with her housework and tolerance for standing and walking.  Pain  Current pain ratin  At best pain ratin  At worst pain ratin  Location: R knee  Quality: discomfort, dull ache, throbbing, sharp and cramping  Relieving factors: medications, change in position and ice  Aggravating factors: walking, standing, sitting and stair climbing  Progression: no change    Social Support  Lives with: alone    Employment status: not working  Treatments  Current treatment: physical therapy        Objective      Palpation     Right   Tenderness of the distal biceps femoris, distal semimembranosus, distal semitendinosus, lateral gastrocnemius and medial gastrocnemius.     Tenderness   Left Knee   Tenderness in the lateral joint line and medial joint line.     Right Knee   Tenderness in the inferior patella, ITB and lateral joint line. No tenderness in the medial joint line.     Active Range of Motion   Left Knee   Flexion: 75 degrees   Extension: -4 degrees   Extensor la degrees     Right Knee   Flexion: 108 degrees   Extension: -2 degrees with pain  Extensor lag: 3 degrees     Passive Range of Motion   Left Knee   Extension: -2 degrees     Right Knee   Flexion: 85 degrees with pain  Extension: -2 degrees with pain    Mobility   Patellar Mobility:     Right Knee   WFL: medial and lateral  Hypomobile: superior and inferior     Strength/Myotome Testing     Left Hip   Planes of Motion   Flexion: 3  Extension: 4  Abduction: 4-  Adduction: 4-    Right Hip   Planes of Motion   Flexion: 3+  Extension: 4  Abduction: 4-  Adduction: 4-    Left Knee   Flexion: 4-  Extension: 4-  Quadriceps contraction: good    Right Knee   Flexion: 4  Extension: 4-  Quadriceps contraction: good    Ambulation     Observational Gait   Gait: antalgic   Increased right stance time. Decreased left stance time.   Left foot contact pattern: foot flat  Right foot contact pattern: heel to toe  Base of support: increased    Additional Observational Gait Details  Patient continues to ambulate with decreased cosmo and a lack of terminal knee extension to the R knee.                  Precautions: Hx of L TKA 2022      Date  Reassess   FOTO   44 JF     Manuals        Passive hamstring, quad stretch Ds+ gastroc,hs,opp k-c Ds + gastroc, opp k-c, hs  JF_ gastroc JF   8 min NT           Neuro Re-Ed        Tandem walk-foam 5 laps F/B 5 laps 5 laps 5 laps 5 laps    Side to side- foam 5 laps 5 laps 5 laps 5 laps 5 laps   Clamshell 10 x  "5\" 10x  5\" 10 x 5\" 10x 5 \"   10x 5\"   Hip hike 2\" 10 x 3\" 10x 3\" 10 x 3\" 5\" 15x 15x 5\"   SLB-foam  4x 10\"  10\" 5x 10\" 5x   Ther Ex        NuStep for strengthening and mobility 8m L5 8m L5 8m L6 L5 8 min 8m L5   SAQ 3#  20x ea 3# 2/15  3# 5\" 20x 3# 20x ea 5\"   Heel slides        SLR 3# 2/10 3# 2/10 2/10   3#  3# 3x10 3# x10   Bridges c add 20 x5\"  20x 5\" C add 20x 5\" 5\" 20x 20x 5\"   Hooklying tb ABD L4 15x 3\" L4 15x 3\" L 4 15 x 3\" L 4 20 x 3 \" L4 20x 3\"   Hamstring curl  NV 50#  20 x 50 #  50# 20x   LAQ  NV 15 #  20 x 15#  20 x 15# 20x   Ther Activity                        Gait Training                Modalities        CP  defer 10m NT defer                  "

## 2024-09-20 NOTE — LETTER
2024    Robb Hoffmann PA-C  [address]  [address]  [facility] [address]    Patient: Marcus Roman   YOB: 1970   Date of Visit: 2024     Encounter Diagnosis     ICD-10-CM    1. Patellofemoral disorder of right knee  M22.2X1       2. Primary osteoarthritis of right knee  M17.11       3. Chronic pain of right knee  M25.561     G89.29           Dear Dr. Hoffmann:    Thank you for your recent referral of Marcus Roman. Please review the attached evaluation summary from Marcus's recent visit.     Please verify that you agree with the plan of care by signing the attached order.     If you have any questions or concerns, please do not hesitate to call.     I sincerely appreciate the opportunity to share in the care of one of your patients and hope to have another opportunity to work with you in the near future.       Sincerely,    Vera Lipscomb, PT      Referring Provider:      I certify that I have read the below Plan of Care and certify the need for these services furnished under this plan of treatment while under my care.                    Robb Hoffmann PA-C  [address]  [address]  [facility] [address]  Via In Basket          PT Re-Evaluation     Today's date: 2024  Patient name: Marcus Roman  : 1970  MRN: [id number]  Referring provider: Robb Hoffmann PA-C  Dx:   Encounter Diagnosis     ICD-10-CM    1. Patellofemoral disorder of right knee  M22.2X1       2. Primary osteoarthritis of right knee  M17.11       3. Chronic pain of right knee  M25.561     G89.29             Start Time: 0850  Stop Time: 0950  Total time in clinic (min): 60 minutes    Assessment  Impairments: abnormal gait, abnormal or restricted ROM, activity intolerance, impaired physical strength, lacks appropriate home exercise program, pain with function and activity limitations  Symptom irritability: moderate    Assessment details: Patient has attended 15 physical therapy visits to date. She  continues to demonstrates slow improvements to Joo knee ROM and strength. She continues to have good patellar mobility with minimal pain today. There is continued tenderness noted over both the medial and lateral joint line with lateral greater than medial.   Understanding of Dx/Px/POC: good     Prognosis: good    Goals  STGs:  Patient will be independent with HEP in 1-2 visits - MET  Improve knee flexion to 130 degrees for improved tolerance with transfers and adls by 3-4 weeks. - Progressing  Improve knee strength to 3+/5 for improved tolerance with adls and transfers by 3-4 weeks. - MET  Improve knee extension to 0 degrees for improved tolerance with adls and transfers by 3-4 weeks. - Progressing  Standing and walking tolerance will improve to up to 20 minutes without limitation from the R knee by 3-4 weeks. - Not MET      LTGs:  Improve FOTO score from 31 To 52 Indicating improved tolerance with activities involving the LE by discharge. - Progressing  Improve knee strength and rom to wnl for improved tolerance with adls, home duties, and recreation by discharge. - Progressing  Patient will be able to return to normal ambulation without deviation over all terrains without pain or limitation from the knee by discharge. - Progressing  Patient will be able to return to normal home duties without limitation from the knee by discharge. - Progressing      Plan  Patient would benefit from: skilled physical therapy  Planned modality interventions: cryotherapy and thermotherapy: hydrocollator packs    Planned therapy interventions: ADL retraining, balance/weight bearing training, flexibility, functional ROM exercises, gait training, home exercise program, joint mobilization, manual therapy, neuromuscular re-education, patient education, strengthening, stretching, therapeutic activities and therapeutic exercise    Frequency: 2-3x week  Duration in weeks: 4  Plan of Care beginning date: 9/20/2024  Plan of Care expiration  date: 10/18/2024  Treatment plan discussed with: patient  Plan details: Patient informed that from this point forward, to ensure adherence to the aforementioned plan of care, all or some of the treatment may be performed and carried out by a Physical Therapy Assistant (PTA) with supervision from a licensed Physical Therapist (PT) in accordance with [facility] Physical Therapy Practice Act.  Patient will continue to benefit from skilled physical therapy to address the functional deficits that were identified during the evaluation today. We will continue to progress the therapy program to address these functional deficits and achieve the established goals.           Subjective Evaluation    History of Present Illness  Mechanism of injury: Patient presents to out patient physical therapy with chief c/o R knee pain. Patient reports onset of R knee pain over the past 5 months. She notes that she had a L knee replacement in November of 2022 and has been compensating due to continued pain in the L knee which has resulted in her placing more force through the R LE. She feels that she is limited with her standing and walking tolerance along with driving secondary to increased R knee pain. She feels that when she is going up and down stairs she will at times lose her balance but denies any recent falls. She feels that when she is sitting for longer periods of time this will cause her pain in both knees when she tries to stand up. She is limited to sitting or standing in one position for about 10-15 minutes before she needs to change positions due to increased pain.     Update 8/20/2024:  Patient reports that she continues to experience pain in her R knee but feels that she is not able to recover from this pain due to the problems she is having with her L knee. She reports pain to the anterior/inferior aspect of the R knee just inferior to the R patella which is made worse with WB activities. She also finds that  sleeping is difficult as she cannot find a comfortable position for both knees when trying to sleep.     Update 2024:  The patient states that she continues to have pain in her R and L knee. She states that she feels like the pain is getting worse in her L knee. She states that she is able to ambulate without an AD most of the time. She states that she will use her SPC occasionally.             Recurrent probem    Quality of life: good    Patient Goals  Patient goals for therapy: decreased pain, increased motion, increased strength and independence with ADLs/IADLs  Patient goal: Patient wishes to have less pain in her knee to allow for improved tolerance with her housework and tolerance for standing and walking.  Pain  Current pain ratin  At best pain ratin  At worst pain ratin  Location: R knee  Quality: discomfort, dull ache, throbbing, sharp and cramping  Relieving factors: medications, change in position and ice  Aggravating factors: walking, standing, sitting and stair climbing  Progression: no change    Social Support  Lives with: alone    Employment status: not working  Treatments  Current treatment: physical therapy        Objective     Palpation     Right   Tenderness of the distal biceps femoris, distal semimembranosus, distal semitendinosus, lateral gastrocnemius and medial gastrocnemius.     Tenderness   Left Knee   Tenderness in the lateral joint line and medial joint line.     Right Knee   Tenderness in the inferior patella, ITB and lateral joint line. No tenderness in the medial joint line.     Active Range of Motion   Left Knee   Flexion: 75 degrees   Extension: -4 degrees   Extensor la degrees     Right Knee   Flexion: 108 degrees   Extension: -2 degrees with pain  Extensor lag: 3 degrees     Passive Range of Motion   Left Knee   Extension: -2 degrees     Right Knee   Flexion: 85 degrees with pain  Extension: -2 degrees with pain    Mobility   Patellar Mobility:     Right Knee  "  WFL: medial and lateral  Hypomobile: superior and inferior     Strength/Myotome Testing     Left Hip   Planes of Motion   Flexion: 3  Extension: 4  Abduction: 4-  Adduction: 4-    Right Hip   Planes of Motion   Flexion: 3+  Extension: 4  Abduction: 4-  Adduction: 4-    Left Knee   Flexion: 4-  Extension: 4-  Quadriceps contraction: good    Right Knee   Flexion: 4  Extension: 4-  Quadriceps contraction: good    Ambulation     Observational Gait   Gait: antalgic   Increased right stance time. Decreased left stance time.   Left foot contact pattern: foot flat  Right foot contact pattern: heel to toe  Base of support: increased    Additional Observational Gait Details  Patient continues to ambulate with decreased cosmo and a lack of terminal knee extension to the R knee.                  Precautions: Hx of L TKA 11/2022      Date 8/28 8/30 9/11 9/13 9/20 Reassess   FOTO   44 JF     Manuals        Passive hamstring, quad stretch Ds+ gastroc,hs,opp k-c Ds + gastroc, opp k-c, hs  JF_ gastroc JF   8 min NT           Neuro Re-Ed        Tandem walk-foam 5 laps F/B 5 laps 5 laps 5 laps 5 laps    Side to side- foam 5 laps 5 laps 5 laps 5 laps 5 laps   Clamshell 10 x 5\" 10x  5\" 10 x 5\" 10x 5 \"   10x 5\"   Hip hike 2\" 10 x 3\" 10x 3\" 10 x 3\" 5\" 15x 15x 5\"   SLB-foam  4x 10\"  10\" 5x 10\" 5x   Ther Ex        NuStep for strengthening and mobility 8m L5 8m L5 8m L6 L5 8 min 8m L5   SAQ 3#  20x ea 3# 2/15  3# 5\" 20x 3# 20x ea 5\"   Heel slides        SLR 3# 2/10 3# 2/10 2/10   3#  3# 3x10 3# x10   Bridges c add 20 x5\"  20x 5\" C add 20x 5\" 5\" 20x 20x 5\"   Hooklying tb ABD L4 15x 3\" L4 15x 3\" L 4 15 x 3\" L 4 20 x 3 \" L4 20x 3\"   Hamstring curl  NV 50#  20 x 50 #  50# 20x   LAQ  NV 15 #  20 x 15#  20 x 15# 20x   Ther Activity                        Gait Training                Modalities        CP  defer 10m NT defer                                  "

## 2024-09-24 ENCOUNTER — OFFICE VISIT (OUTPATIENT)
Dept: PHYSICAL THERAPY | Facility: CLINIC | Age: 54
End: 2024-09-24
Payer: COMMERCIAL

## 2024-09-24 DIAGNOSIS — G89.29 CHRONIC PAIN OF RIGHT KNEE: ICD-10-CM

## 2024-09-24 DIAGNOSIS — M25.561 CHRONIC PAIN OF RIGHT KNEE: ICD-10-CM

## 2024-09-24 DIAGNOSIS — M22.2X1 PATELLOFEMORAL DISORDER OF RIGHT KNEE: ICD-10-CM

## 2024-09-24 DIAGNOSIS — M17.11 PRIMARY OSTEOARTHRITIS OF RIGHT KNEE: Primary | ICD-10-CM

## 2024-09-24 PROCEDURE — 97110 THERAPEUTIC EXERCISES: CPT

## 2024-09-24 PROCEDURE — 97112 NEUROMUSCULAR REEDUCATION: CPT

## 2024-09-24 NOTE — PROGRESS NOTES
"Daily Note     Today's date: 2024  Patient name: Marcus Roman  : 1970  MRN: [id number]  Referring provider: Robb Hoffmann PA-C  Dx:   Encounter Diagnosis     ICD-10-CM    1. Primary osteoarthritis of right knee  M17.11       2. Patellofemoral disorder of right knee  M22.2X1       3. Chronic pain of right knee  M25.561     G89.29           Start Time: 1015  Stop Time: 1100  Total time in clinic (min): 45 minutes    Subjective:  I have a lot of pain in my right  knee today., I do feel my knee has improved some. I have most trouble with stairs and walking or standing too long.,       Objective: See treatment diary below      Assessment: Tolerated treatment well. Patient would benefit from continued PT  pt reports having moderate to strong pain in her right knee through out her session today. She reports having trouble getting up from sitting and being on her feet too much with walking and standing.,  pt states that the pain is tolerable , but, is painful in her knee.  Pt reports that she is using ice and heat at home when needed,.  Pt did not want to be stretched today due to the pain in her knee. She did do a self calf stretch today ,.       Plan: Continue per plan of care.      Precautions: Hx of L TKA 2022      Date  Reassess   FOTO   44 JF     Manuals        Passive hamstring, quad stretch  Ds + gastroc, opp k-c, hs  JF_ gastroc JF   8 min NT           Neuro Re-Ed        Tandem walk-foam 5 laps F/B 5 laps 5 laps 5 laps 5 laps    Side to side- foam 5 laps 5 laps 5 laps 5 laps 5 laps   Clamshell 10 x 5\" 10x  5\" 10 x 5\" 10x 5 \"   10x 5\"   Hip hike 2\" 10 x 3\" 10x 3\" 10 x 3\" 5\" 15x 15x 5\"   SLB-foam 10\"  4 \" 4x 10\"  10\" 5x 10\" 5x   Ther Ex        NuStep for strengthening and mobility 8m L5 8m L5 8m L6 L5 8 min 8m L5   SAQ  3# 2/15  3# 5\" 20x 3# 20x ea 5\"   Heel slides        SLR 3# 2/10 3# 2/10 2/10   3#  3# 3x10 3# x10   Bridges c add 20 x5\"  20x 5\" C add 20x 5\" 5\" 20x 20x " "5\"   Hooklying tb ABD L4 15x 3\" L4 15x 3\" L 4 15 x 3\" L 4 20 x 3 \" L4 20x 3\"   Hamstring curl 50 #  20 x  NV 50#  20 x 50 #  50# 20x   LAQ 15 #  20 x NV 15 #  20 x 15#  20 x 15# 20x   Ther Activity                        Gait Training                Modalities        CP  defer 10m NT defer                  "

## 2024-09-26 ENCOUNTER — OFFICE VISIT (OUTPATIENT)
Dept: PHYSICAL THERAPY | Facility: CLINIC | Age: 54
End: 2024-09-26
Payer: COMMERCIAL

## 2024-09-26 DIAGNOSIS — M25.561 CHRONIC PAIN OF RIGHT KNEE: Primary | ICD-10-CM

## 2024-09-26 DIAGNOSIS — M17.11 PRIMARY OSTEOARTHRITIS OF RIGHT KNEE: ICD-10-CM

## 2024-09-26 DIAGNOSIS — G89.29 CHRONIC PAIN OF RIGHT KNEE: Primary | ICD-10-CM

## 2024-09-26 DIAGNOSIS — M22.2X1 PATELLOFEMORAL DISORDER OF RIGHT KNEE: ICD-10-CM

## 2024-09-26 PROCEDURE — 97112 NEUROMUSCULAR REEDUCATION: CPT

## 2024-09-26 PROCEDURE — 97110 THERAPEUTIC EXERCISES: CPT

## 2024-09-26 NOTE — PROGRESS NOTES
"Daily Note     Today's date: 2024  Patient name: Marcus Roman  : 1970  MRN: [id number]  Referring provider: Robb Hoffmann PA-C  Dx:   Encounter Diagnosis     ICD-10-CM    1. Chronic pain of right knee  M25.561     G89.29       2. Patellofemoral disorder of right knee  M22.2X1       3. Primary osteoarthritis of right knee  M17.11           Start Time: 0930  Stop Time: 1015  Total time in clinic (min): 45 minutes    Subjective: I am having some pain in my right calf and behind my right knee today.,       Objective: See treatment diary below      Assessment: Tolerated treatment well. Patient would benefit from continued PT   pt reports having pain in her right knee and calf pre treatment today., she states having pain through out her session today . We worked on her calf to help loosen as well as her hamstring . She states having pain at times being more painful with standing exercises and balance        Plan: Continue per plan of care.      Precautions: Hx of L TKA 2022      Date  Reassess   FOTO   44 JF     Manuals        Passive hamstring, quad stretch   + gastroc, opp k-c, hs   JF JF_ gastroc JF   8 min NT           Neuro Re-Ed        Tandem walk-foam 5 laps F/B 5 laps 5 laps 5 laps 5 laps    Side to side- foam 5 laps 5 laps 5 laps 5 laps 5 laps   Clamshell 10 x 5\" 10x  5\" 10 x 5\" 10x 5 \"   10x 5\"   Hip hike 2\" 10 x 3\" 10x 3\" 10 x 5\" 5\" 15x 15x 5\"   SLB-foam 10\"  4 \" 5x 10\"  10\" 5x 10\" 5x   Ther Ex        NuStep for strengthening and mobility 8m L5 8m L5 8m L6 L5 8 min 8m L5   SAQ    3# 5\" 20x 3# 20x ea 5\"   Heel slides        SLR 3# 2/10 3# 2/10 2/10   3#  3# 3x10 3# x10   Bridges c add 20 x5\"  20x 5\" C add 20x 5\" 5\" 20x 20x 5\"   Hooklying tb ABD L4 15x 3\" L4 15x 3\" L 4 15 x 3\" L 4 20 x 3 \" L4 20x 3\"   Hamstring curl 50 #  20 x  50 #  20 x 50#  20 x 50 #  50# 20x   LAQ 15 #  20 x 15#  20 x 15 #  20 x 15#  20 x 15# 20x   Ther Activity                        Gait " Training                Modalities        CP   10m NT defer

## 2024-09-30 ENCOUNTER — TELEPHONE (OUTPATIENT)
Dept: PAIN MEDICINE | Facility: CLINIC | Age: 54
End: 2024-09-30

## 2024-09-30 ENCOUNTER — OFFICE VISIT (OUTPATIENT)
Dept: PHYSICAL THERAPY | Facility: CLINIC | Age: 54
End: 2024-09-30
Payer: COMMERCIAL

## 2024-09-30 DIAGNOSIS — G89.29 CHRONIC PAIN OF RIGHT KNEE: ICD-10-CM

## 2024-09-30 DIAGNOSIS — M25.561 CHRONIC PAIN OF RIGHT KNEE: ICD-10-CM

## 2024-09-30 DIAGNOSIS — M17.11 PRIMARY OSTEOARTHRITIS OF RIGHT KNEE: Primary | ICD-10-CM

## 2024-09-30 DIAGNOSIS — M22.2X1 PATELLOFEMORAL DISORDER OF RIGHT KNEE: ICD-10-CM

## 2024-09-30 PROCEDURE — 97110 THERAPEUTIC EXERCISES: CPT

## 2024-09-30 PROCEDURE — 97112 NEUROMUSCULAR REEDUCATION: CPT

## 2024-09-30 NOTE — PROGRESS NOTES
"Daily Note     Today's date: 2024  Patient name: Marcus Roman  : 1970  MRN: [id number]  Referring provider: Robb Hoffmann PA-C  Dx:   Encounter Diagnosis     ICD-10-CM    1. Primary osteoarthritis of right knee  M17.11       2. Patellofemoral disorder of right knee  M22.2X1       3. Chronic pain of right knee  M25.561     G89.29           Start Time: 1027  Stop Time: 1100  Total time in clinic (min): 33 minutes    Subjective: My right knee is sore today.  I had trouble getting up from sitting,.       Objective: See treatment diary below      Assessment: Tolerated treatment well. Patient would benefit from continued PT   pt came into therapy today reporting strong pain in her left knee. She had trouble doing her exercises today due to pain ., she asked to hold on some exercises today because her pain was so bad.  We will try to do all exercises next visit if she is able to tolerate. Pt had trouble today doing the leg machines due to left knee pain.       Plan: Continue per plan of care.      Precautions: Hx of L TKA 2022      Date  Reassess   FOTO        Manuals        Passive hamstring, quad stretch   + gastroc, opp k-c, hs   JF held JF   8 min NT           Neuro Re-Ed        Tandem walk-foam 5 laps F/B 5 laps held 5 laps 5 laps    Side to side- foam 5 laps 5 laps held 5 laps 5 laps   Clamshell 10 x 5\" 10x  5\"  10x 5 \"   10x 5\"   Hip hike 2\" 10 x 3\" 10x 3\" 10 x 5\" 5\" 15x 15x 5\"   SLB-foam 10\"  4 \" 5x 10\"  10\" 5x 10\" 5x   Ther Ex        NuStep for strengthening and mobility 8m L5 8m L5 8m L6 L5 8 min 8m L5   SAQ    3# 5\" 20x 3# 20x ea 5\"   Heel slides        SLR 3# 2/10 3# 2/10 2/10   3#  3# 3x10 3# x10   Bridges c add 20 x5\"  20x 5\" C add 20x 5\" 5\" 20x 20x 5\"   Hooklying tb ABD L4 15x 3\" L4 15x 3\" L 4 15 x 3\" L 4 20 x 3 \" L4 20x 3\"   Hamstring curl 50 #  20 x  50 #  20 x 50#  20 x 50 #  50# 20x   LAQ 15 #  20 x 15#  20 x 15 #  20 x 15#  20 x 15# 20x   Ther Activity      "                   Gait Training                Modalities        CP   10m NT defer

## 2024-09-30 NOTE — TELEPHONE ENCOUNTER
Spoke with daughter was able to tell her I moved her mother to 11/22 once the agreement comes through I can try to move her up sooner

## 2024-10-02 ENCOUNTER — OFFICE VISIT (OUTPATIENT)
Dept: PHYSICAL THERAPY | Facility: CLINIC | Age: 54
End: 2024-10-02
Payer: COMMERCIAL

## 2024-10-02 DIAGNOSIS — M25.561 CHRONIC PAIN OF RIGHT KNEE: ICD-10-CM

## 2024-10-02 DIAGNOSIS — M22.2X1 PATELLOFEMORAL DISORDER OF RIGHT KNEE: ICD-10-CM

## 2024-10-02 DIAGNOSIS — M17.11 PRIMARY OSTEOARTHRITIS OF RIGHT KNEE: Primary | ICD-10-CM

## 2024-10-02 DIAGNOSIS — G89.29 CHRONIC PAIN OF RIGHT KNEE: ICD-10-CM

## 2024-10-02 PROCEDURE — 97140 MANUAL THERAPY 1/> REGIONS: CPT

## 2024-10-02 PROCEDURE — 97112 NEUROMUSCULAR REEDUCATION: CPT

## 2024-10-02 PROCEDURE — 97110 THERAPEUTIC EXERCISES: CPT

## 2024-10-02 NOTE — PROGRESS NOTES
"Daily Note     Today's date: 10/2/2024  Patient name: Marcus Roman  : 1970  MRN: [id number]  Referring provider: Robb Hoffmann PA-C  Dx:   Encounter Diagnosis     ICD-10-CM    1. Primary osteoarthritis of right knee  M17.11       2. Patellofemoral disorder of right knee  M22.2X1       3. Chronic pain of right knee  M25.561     G89.29           Start Time: 1015  Stop Time: 1100  Total time in clinic (min): 45 minutes    Subjective: Pt comments on increased discomfort today; she notes no increased activity yesterday which would have causes same.      Objective: See treatment diary below. FOTO 36, decreased from last time.      Assessment: Tolerated treatment well. Patient exhibited good technique with therapeutic exercises. She was able to complete full program despite increased soreness. LE tightness is noted today.      Plan: Continue per plan of care.      Precautions: Hx of L TKA 2022      Date 9/24 9/26 9/30 10/2 9/20 Reassess   FOTO    Ds 36    Manuals        Passive hamstring, quad stretch   + gastroc, opp k-c, hs   JF held ds NT           Neuro Re-Ed        Tandem walk-foam 5 laps F/B 5 laps held 5 laps 5 laps    Side to side- foam 5 laps 5 laps held 5 laps 5 laps   Clamshell 10 x 5\" 10x  5\"  10x 5 \"   10x 5\"   Hip hike 2\" 10 x 3\" 10x 3\" 10 x 5\" 5\" 15x 15x 5\"   SLB-foam 10\"  4 \" 5x 10\"  np 10\" 5x   Ther Ex        NuStep for strengthening and mobility 8m L5 8m L5 8m L6 8m L6 8m L5   SLR 3# 2/10 3# 2/10 2/10   3#  3# 15x  3# x10   Bridges c add 20 x5\"  20x 5\"  20x 5\" 20x 5\" 20x 5\"   Hooklying tb ABD L4 15x 3\" L4 15x 3\" L 4 15 x 3\" np L4 20x 3\"   Hamstring curl 50 #  20 x  50 #  20 x 50#  20 x 50 # 2/15 50# 20x   LAQ 15 #  20 x 15#  20 x 15 #  20 x 15#  2/15 15# 20x   Ther Activity                        Gait Training                Modalities        CP   10m defer defer                        "

## 2024-10-08 ENCOUNTER — OFFICE VISIT (OUTPATIENT)
Dept: PHYSICAL THERAPY | Facility: CLINIC | Age: 54
End: 2024-10-08
Payer: COMMERCIAL

## 2024-10-08 DIAGNOSIS — M25.561 CHRONIC PAIN OF RIGHT KNEE: ICD-10-CM

## 2024-10-08 DIAGNOSIS — M17.11 PRIMARY OSTEOARTHRITIS OF RIGHT KNEE: Primary | ICD-10-CM

## 2024-10-08 DIAGNOSIS — M22.2X1 PATELLOFEMORAL DISORDER OF RIGHT KNEE: ICD-10-CM

## 2024-10-08 DIAGNOSIS — G89.29 CHRONIC PAIN OF RIGHT KNEE: ICD-10-CM

## 2024-10-08 PROCEDURE — 97110 THERAPEUTIC EXERCISES: CPT

## 2024-10-08 PROCEDURE — 97140 MANUAL THERAPY 1/> REGIONS: CPT

## 2024-10-08 PROCEDURE — 97112 NEUROMUSCULAR REEDUCATION: CPT

## 2024-10-08 NOTE — PROGRESS NOTES
"Daily Note     Today's date: 10/8/2024  Patient name: Marcus Roman  : 1970  MRN: [id number]  Referring provider: Robb Hoffmann PA-C  Dx:   Encounter Diagnosis     ICD-10-CM    1. Primary osteoarthritis of right knee  M17.11       2. Patellofemoral disorder of right knee  M22.2X1       3. Chronic pain of right knee  M25.561     G89.29           Start Time: 09  Stop Time: 1015  Total time in clinic (min): 45 minutes    Subjective: Pt notes some tenderness along the medial aspect of the R knee.      Objective: See treatment diary below      Assessment: Tolerated treatment well. Patient exhibited good technique with therapeutic exercises      Plan: Continue per plan of care.      Precautions: Hx of L TKA 2022      Date 9/24 9/26 9/30 10/2 10/8   FOTO    Ds 36    Manuals        Passive hamstring, quad stretch   + gastroc, opp k-c, hs   JF held ds Ds+ hs/gastroc, opp k-c           Neuro Re-Ed        Tandem walk-foam 5 laps F/B 5 laps held 5 laps 5 laps    Side to side- foam 5 laps 5 laps held 5 laps 5 laps   Clamshell 10 x 5\" 10x  5\"  10x 5 \"   10x 5\"   Hip hike 2\" 10 x 3\" 10x 3\" 10 x 5\" 5\" 15x 15x 5\"   SLB-foam 10\"  4 \" 5x 10\"  np np   Ther Ex        NuStep for strengthening and mobility 8m L5 8m L5 8m L6 8m L6 8m L5   SLR 3# 2/10 3# 2/10 2/10   3#  3# 15x  3# x10   Bridges c add 20 x5\"  20x 5\"  20x 5\" 20x 5\" 20x 5\"   Hooklying tb ABD L4 15x 3\" L4 15x 3\" L 4 15 x 3\" np L4 20x 3\"   Hamstring curl 50 #  20 x  50 #  20 x 50#  20 x 50 # 2/15 50# 2/10   LAQ 15 #  20 x 15#  20 x 15 #  20 x 15#  2/15 15# 20x   Ther Activity                        Gait Training                Modalities        CP   10m defer defer                          "

## 2024-10-10 ENCOUNTER — OFFICE VISIT (OUTPATIENT)
Dept: PHYSICAL THERAPY | Facility: CLINIC | Age: 54
End: 2024-10-10
Payer: COMMERCIAL

## 2024-10-10 DIAGNOSIS — M25.561 CHRONIC PAIN OF RIGHT KNEE: Primary | ICD-10-CM

## 2024-10-10 DIAGNOSIS — M17.11 PRIMARY OSTEOARTHRITIS OF RIGHT KNEE: ICD-10-CM

## 2024-10-10 DIAGNOSIS — G89.29 CHRONIC PAIN OF RIGHT KNEE: Primary | ICD-10-CM

## 2024-10-10 DIAGNOSIS — M22.2X1 PATELLOFEMORAL DISORDER OF RIGHT KNEE: ICD-10-CM

## 2024-10-10 PROCEDURE — 97112 NEUROMUSCULAR REEDUCATION: CPT

## 2024-10-10 PROCEDURE — 97110 THERAPEUTIC EXERCISES: CPT

## 2024-10-10 NOTE — PROGRESS NOTES
"Daily Note     Today's date: 10/10/2024  Patient name: Marcus Roman  : 1970  MRN: [id number]  Referring provider: Robb Hoffmann PA-C  Dx:   Encounter Diagnosis     ICD-10-CM    1. Chronic pain of right knee  M25.561     G89.29       2. Patellofemoral disorder of right knee  M22.2X1       3. Primary osteoarthritis of right knee  M17.11           Start Time: 1015  Stop Time: 1100  Total time in clinic (min): 45 minutes    Subjective:  My right knee is sore today.       Objective: See treatment diary below      Assessment: Tolerated treatment well. Patient would benefit from continued PT   pt reports having a lot of pain in her later left knee  through out her session.,  she was upset about the pain and wants to get rid of it.  She states feeling pretty good in her right knee and tolerated therapy well .  We will increase her program as able.  She reports at this times that she is unable to increase wt due to the pain in her left knee.       Plan: Continue per plan of care.      Precautions: Hx of L TKA 2022      Date 10/10 9/26 9/30 10/2 10/8   FOTO    Ds 36    Manuals        Passive hamstring, quad stretch   + gastroc, opp k-c, hs   JF held ds Ds+ hs/gastroc, opp k-c           Neuro Re-Ed        Tandem walk-foam 5 laps F/B 5 laps held 5 laps 5 laps    Side to side- foam 5 laps 5 laps held 5 laps 5 laps   Clamshell 10 x 5\" 10x  5\"  10x 5 \"   10x 5\"   Hip hike 2\" 10 x 3\" 10x 3\" 10 x 5\" 5\" 15x 15x 5\"   SLB-foam 10\"  4 \" 5x 10\"  np np   Ther Ex        NuStep for strengthening and mobility 8m L5 8m L5 8m L6 8m L6 8m L5   SLR 3# 2/10 3# 2/10 2/10   3#  3# 15x  3# x10   Bridges c add 20 x5\"  20x 5\"  20x 5\" 20x 5\" 20x 5\"   Hooklying tb ABD L4 15x 3\" L4 15x 3\" L 4 15 x 3\" np L4 20x 3\"   Hamstring curl 50 #  20 x  50 #  20 x 50#  20 x 50 # 2/15 50# 2/10   LAQ 15 #  20 x 15#  20 x 15 #  20 x 15#  2/15 15# 20x   Ther Activity                        Gait Training                Modalities        CP   10m defer " defer

## 2024-10-14 ENCOUNTER — OFFICE VISIT (OUTPATIENT)
Dept: PHYSICAL THERAPY | Facility: CLINIC | Age: 54
End: 2024-10-14
Payer: COMMERCIAL

## 2024-10-14 DIAGNOSIS — M25.561 CHRONIC PAIN OF RIGHT KNEE: ICD-10-CM

## 2024-10-14 DIAGNOSIS — M22.2X1 PATELLOFEMORAL DISORDER OF RIGHT KNEE: ICD-10-CM

## 2024-10-14 DIAGNOSIS — G89.29 CHRONIC PAIN OF RIGHT KNEE: ICD-10-CM

## 2024-10-14 DIAGNOSIS — M17.11 PRIMARY OSTEOARTHRITIS OF RIGHT KNEE: Primary | ICD-10-CM

## 2024-10-14 PROCEDURE — 97110 THERAPEUTIC EXERCISES: CPT

## 2024-10-14 PROCEDURE — 97112 NEUROMUSCULAR REEDUCATION: CPT

## 2024-10-14 NOTE — PROGRESS NOTES
"Daily Note     Today's date: 10/14/2024  Patient name: Marcus Roman  : 1970  MRN: [id number]  Referring provider: Robb Hoffmann PA-C  Dx:   Encounter Diagnosis     ICD-10-CM    1. Primary osteoarthritis of right knee  M17.11       2. Patellofemoral disorder of right knee  M22.2X1       3. Chronic pain of right knee  M25.561     G89.29           Start Time: 0930  Stop Time: 1015  Total time in clinic (min): 45 minutes    Subjective: I have some mild pain in my right knee today.      Objective: See treatment diary below      Assessment: Tolerated treatment well. Patient would benefit from continued PT  pt reports that her right knee is still around a 5/10 most of the time.  She feels the pain over the entire front of her right knee.   She completed her full program with pain through out in both knees.  She states that she is still having trouble with stairs going up and down., we continue to work on her knee strength and flexibility.,       Plan: Continue per plan of care.      Precautions: Hx of L TKA 2022      Date 10/10 10/14 9/30 10/2 10/8   FOTO    Ds 36    Manuals        Passive hamstring, quad stretch   + gastroc, opp k-c, hs   JF held ds Ds+ hs/gastroc, opp k-c           Neuro Re-Ed        Tandem walk-foam 5 laps F/B 5 laps held 5 laps 5 laps    Side to side- foam 5 laps 5 laps held 5 laps 5 laps   Clamshell 10 x 5\" 10x  5\"  10x 5 \"   10x 5\"   Hip hike 2\" 10 x 3\" 10x 3\" 10 x 5\" 5\" 15x 15x 5\"   SLB-foam 10\"  4 \" 5x 10\"  np np   Ther Ex        NuStep for strengthening and mobility 8m L5 8m L5 8m L6 8m L6 8m L5   SLR 3# 2/10 3# 2/10 2/10   3#  3# 15x  3# x10   Bridges c add 20 x5\"  20x 5\"  20x 5\" 20x 5\" 20x 5\"   Hooklying tb ABD L4 15x 3\" L4 15x 3\" L 4 15 x 3\" np L4 20x 3\"   Hamstring curl 50 #  20 x  50 #  20 x 50#  20 x 50 # 2/15 50# 2/10   LAQ 15 #  20 x 15#  20 x 15 #  20 x 15#  2/15 15# 20x   Ther Activity                        Gait Training                Modalities        CP   10m defer " defer

## 2024-10-16 ENCOUNTER — APPOINTMENT (OUTPATIENT)
Dept: PHYSICAL THERAPY | Facility: CLINIC | Age: 54
End: 2024-10-16
Payer: COMMERCIAL

## 2024-10-31 ENCOUNTER — DOCUMENTATION (OUTPATIENT)
Dept: PAIN MEDICINE | Facility: CLINIC | Age: 54
End: 2024-10-31

## 2024-10-31 NOTE — PROGRESS NOTES
Patient is scheduled for an appt with [facility] on 11/4/24 for follow up to injection. Procedure is scheduled for 11/22/24, appt will need to be rescheduled until after 12/22/24. I called using  Trace # [phone] and left detailed voice message for patient that appt will need to be rescheduled.   I scheduled appt for 12/31/24 @ 9am for patient for one month follow up due to injection

## 2024-11-02 ENCOUNTER — HOSPITAL ENCOUNTER (EMERGENCY)
Facility: HOSPITAL | Age: 54
Discharge: HOME | End: 2024-11-02
Attending: EMERGENCY MEDICINE
Payer: COMMERCIAL

## 2024-11-02 VITALS
TEMPERATURE: 97.6 F | HEART RATE: 77 BPM | SYSTOLIC BLOOD PRESSURE: 153 MMHG | OXYGEN SATURATION: 100 % | DIASTOLIC BLOOD PRESSURE: 82 MMHG | RESPIRATION RATE: 18 BRPM

## 2024-11-02 DIAGNOSIS — H93.13 TINNITUS OF BOTH EARS: Primary | ICD-10-CM

## 2024-11-02 DIAGNOSIS — H93.13 RINGING IN EARS, BILATERAL: ICD-10-CM

## 2024-11-02 PROCEDURE — 99284 EMERGENCY DEPT VISIT MOD MDM: CPT

## 2024-11-02 PROCEDURE — 99284 EMERGENCY DEPT VISIT MOD MDM: CPT | Performed by: EMERGENCY MEDICINE

## 2024-11-02 PROCEDURE — 93005 ELECTROCARDIOGRAM TRACING: CPT

## 2024-11-02 RX ORDER — MECLIZINE HCL 12.5 MG 12.5 MG/1
12.5 TABLET ORAL 3 TIMES DAILY PRN
Qty: 15 TABLET | Refills: 0 | Status: SHIPPED | OUTPATIENT
Start: 2024-11-02

## 2024-11-02 RX ORDER — NAPROXEN 500 MG/1
500 TABLET ORAL 2 TIMES DAILY PRN
Qty: 20 TABLET | Refills: 0 | Status: SHIPPED | OUTPATIENT
Start: 2024-11-02

## 2024-11-02 RX ORDER — MECLIZINE HCL 12.5 MG 12.5 MG/1
25 TABLET ORAL ONCE
Status: COMPLETED | OUTPATIENT
Start: 2024-11-02 | End: 2024-11-02

## 2024-11-02 RX ORDER — NAPROXEN 250 MG/1
500 TABLET ORAL ONCE
Status: COMPLETED | OUTPATIENT
Start: 2024-11-02 | End: 2024-11-02

## 2024-11-02 RX ADMIN — NAPROXEN 500 MG: 250 TABLET ORAL at 18:12

## 2024-11-02 RX ADMIN — MECLIZINE HYDROCHLORIDE 25 MG: 12.5 TABLET ORAL at 18:12

## 2024-11-03 LAB
ATRIAL RATE: 91 BPM
P AXIS: 56 DEGREES
PR INTERVAL: 138 MS
QRS AXIS: 44 DEGREES
QRSD INTERVAL: 84 MS
QT INTERVAL: 356 MS
QTC INTERVAL: 437 MS
T WAVE AXIS: 1 DEGREES
VENTRICULAR RATE: 91 BPM

## 2024-11-03 PROCEDURE — 93010 ELECTROCARDIOGRAM REPORT: CPT | Performed by: INTERNAL MEDICINE

## 2024-11-03 NOTE — ED PROVIDER NOTES
Time reflects when diagnosis was documented in both MDM as applicable and the Disposition within this note       Time User Action Codes Description Comment    11/2/2024  6:07 PM Yg Mcneil Add [H93.13] Ringing in ears, bilateral     11/2/2024  6:09 PM Yg Mcneil Add [H93.13] Tinnitus of both ears     11/2/2024  6:09 PM Yg Mcneil Modify [H93.13] Ringing in ears, bilateral     11/2/2024  6:09 PM Yg Mcneil Modify [H93.13] Tinnitus of both ears           ED Disposition       ED Disposition   Discharge    Condition   Stable    Date/Time   Sat Nov 2, 2024  6:06 PM    Comment   Marcus Roman discharge to home/self care.                   Assessment & Plan       Medical Decision Making        Initial ED assessment:     53-year-old female presents with feeling of dizziness and  ringing in her ears, was diagnosed with tinnitus by ENT, had a negative CAT scan, has an outpatient MRI scheduled for later this month.   hoping to get MRI here also says she feels unstable were she needs help walking    Pathology at risk for includes but is not limited to:   Tinnitus, vertigo, doubt intracranial hemorrhage due to negative CT    Initial ED plan:     with negative CT, doubt mass affect, patient is able to ambulate says she wants a walker to help her walk, will give her a walker here        Final ED summary/disposition:   After evaluation and workup in the emergency department, discharge will follow with outpatient providers    Risk  Prescription drug management.             Medications   meclizine (ANTIVERT) tablet 25 mg (25 mg Oral Given 11/2/24 1812)   naproxen (NAPROSYN) tablet 500 mg (500 mg Oral Given 11/2/24 1812)       ED Risk Strat Scores                                               History of Present Illness       Chief Complaint   Patient presents with    Dizziness     Nausea, dizziness x1.5 week, fall x2, denies headstrike, seen last Sunday for same thing, headache, denies chest pain       Past  Medical History:   Diagnosis Date    Anxiety     Depression     GERD (gastroesophageal reflux disease)     Hyperlipidemia     Hypertension       Past Surgical History:   Procedure Laterality Date    KNEE ARTHROPLASTY Left       History reviewed. No pertinent family history.   Social History     Tobacco Use    Smoking status: Never    Smokeless tobacco: Never   Vaping Use    Vaping status: Never Used   Substance Use Topics    Alcohol use: Never    Drug use: Never      E-Cigarette/Vaping    E-Cigarette Use Never User       E-Cigarette/Vaping Substances      I have reviewed and agree with the history as documented.       History provided by:  Patient  Dizziness  Quality:  Imbalance  Severity:  Moderate  Duration:  3 weeks  Timing:  Constant  Progression:  Unchanged  Chronicity:  New  Context comment:  Diagnosed with tinnitus and dizziness, following with ENT for this, has an outpatient MRI scheduled later this month, says she feels off balance emergency if she can get the MRI earlier  Associated symptoms: no chest pain, no diarrhea, no headaches, no nausea, no palpitations, no shortness of breath and no vomiting        Review of Systems   Constitutional:  Negative for activity change, chills, diaphoresis and fever.   HENT:  Negative for congestion, sinus pressure and sore throat.    Eyes:  Negative for pain and visual disturbance.   Respiratory:  Negative for cough, chest tightness, shortness of breath, wheezing and stridor.    Cardiovascular:  Negative for chest pain and palpitations.   Gastrointestinal:  Negative for abdominal distention, abdominal pain, constipation, diarrhea, nausea and vomiting.   Genitourinary:  Negative for dysuria and frequency.   Musculoskeletal:  Negative for neck pain and neck stiffness.   Skin:  Negative for rash.   Neurological:  Positive for dizziness. Negative for speech difficulty, light-headedness, numbness and headaches.           Objective       ED Triage Vitals   Temperature Pulse  Blood Pressure Respirations SpO2 Patient Position - Orthostatic VS   11/02/24 1712 11/02/24 1707 11/02/24 1711 11/02/24 1707 11/02/24 1707 11/02/24 1707   97.6 °F (36.4 °C) 77 153/82 18 100 % Sitting      Temp Source Heart Rate Source BP Location FiO2 (%) Pain Score    11/02/24 1712 11/02/24 1707 11/02/24 1707 -- --    Oral Monitor Right arm        Vitals      Date and Time Temp Pulse SpO2 Resp BP Pain Score FACES Pain Rating User   11/02/24 1712 97.6 °F (36.4 °C) -- -- -- -- -- -- RI   11/02/24 1711 -- -- -- -- 153/82 -- -- RI   11/02/24 1707 -- 77 100 % 18 -- -- -- RI            Physical Exam  Vitals reviewed.   Constitutional:       General: She is not in acute distress.     Appearance: She is well-developed. She is not diaphoretic.   HENT:      Head: Normocephalic and atraumatic.      Right Ear: External ear normal.      Left Ear: External ear normal.      Nose: Nose normal.   Eyes:      General:         Right eye: No discharge.         Left eye: No discharge.      Pupils: Pupils are equal, round, and reactive to light.   Neck:      Trachea: No tracheal deviation.   Cardiovascular:      Rate and Rhythm: Normal rate and regular rhythm.      Heart sounds: Normal heart sounds. No murmur heard.  Pulmonary:      Effort: Pulmonary effort is normal. No respiratory distress.      Breath sounds: Normal breath sounds. No stridor.   Abdominal:      General: There is no distension.      Palpations: Abdomen is soft.      Tenderness: There is no abdominal tenderness. There is no guarding or rebound.   Musculoskeletal:         General: Normal range of motion.      Cervical back: Normal range of motion and neck supple.   Skin:     General: Skin is warm and dry.      Coloration: Skin is not pale.      Findings: No erythema.   Neurological:      General: No focal deficit present.      Mental Status: She is alert and oriented to person, place, and time.         Results Reviewed       None            No orders to display        Procedures    ED Medication and Procedure Management   Prior to Admission Medications   Prescriptions Last Dose Informant Patient Reported? Taking?   Cholecalciferol (Vitamin D3) 1.25 MG (45749 UT) CAPS  Self Yes No   Diclofenac Sodium (VOLTAREN) 1 %  Self Yes No   Sig: Apply 1 application topically   Wegovy 0.25 MG/0.5ML  Self Yes No   Sig: Inject 0.25 mg under the skin once a week   acetaminophen (TYLENOL) 500 mg tablet  Self Yes No   Sig: Take 500 mg by mouth every 6 (six) hours as needed   Patient not taking: Reported on 7/9/2024   amLODIPine (NORVASC) 5 mg tablet  Self Yes No   Patient not taking: Reported on 7/26/2024   amlodipine-olmesartan (SHAW) 10-40 MG   Yes No   amoxicillin-clavulanate (AUGMENTIN) 875-125 mg per tablet   Yes No   Sig: Take 1 tablet by mouth 2 (two) times a day   buPROPion (WELLBUTRIN SR) 150 mg 12 hr tablet  Self Yes No   Sig: Take 150 mg by mouth 2 (two) times a day   furosemide (LASIX) 20 mg tablet  Self Yes No   Sig: Take 20 mg by mouth   hydroCHLOROthiazide 25 mg tablet   Yes No   losartan (COZAAR) 100 MG tablet  Self No No   Sig: Take 1 tablet (100 mg total) by mouth daily   Patient not taking: Reported on 9/12/2023   losartan-hydrochlorothiazide (HYZAAR) 100-25 MG per tablet  Self Yes No   meclizine (ANTIVERT) 25 mg tablet   Yes No   Sig: Take 25 mg by mouth Three times daily as needed   metoprolol succinate (TOPROL-XL) 50 mg 24 hr tablet  Self Yes No   Sig: Take 50 mg by mouth daily   Patient not taking: Reported on 7/9/2024   metoprolol tartrate (LOPRESSOR) 50 mg tablet  Self Yes No   Sig: Take 50 mg by mouth 2 (two) times a day   Patient not taking: Reported on 7/9/2024   omeprazole (PriLOSEC OTC) 20 MG tablet  Self No No   Sig: Take 1 tablet (20 mg total) by mouth daily   Patient not taking: Reported on 7/9/2024   predniSONE 20 mg tablet   Yes No   Sig: Take 60 mg by mouth daily   rosuvastatin (CRESTOR) 20 MG tablet  Self Yes No   Patient not taking: Reported on 7/9/2024    rosuvastatin (CRESTOR) 40 MG tablet  Self Yes No   traMADol (Ultram) 50 mg tablet  Self No No   Sig: Take 1 tablet (50 mg total) by mouth every 6 (six) hours as needed for moderate pain      Facility-Administered Medications: None     Discharge Medication List as of 11/2/2024  6:09 PM        START taking these medications    Details   !! meclizine (ANTIVERT) 12.5 MG tablet Take 1 tablet (12.5 mg total) by mouth 3 (three) times a day as needed for dizziness, Starting Sat 11/2/2024, Normal      naproxen (NAPROSYN) 500 mg tablet Take 1 tablet (500 mg total) by mouth 2 (two) times a day as needed for mild pain, Starting Sat 11/2/2024, Normal       !! - Potential duplicate medications found. Please discuss with provider.        CONTINUE these medications which have NOT CHANGED    Details   acetaminophen (TYLENOL) 500 mg tablet Take 500 mg by mouth every 6 (six) hours as needed, Historical Med      amLODIPine (NORVASC) 5 mg tablet Starting Thu 4/27/2023, Historical Med      amlodipine-olmesartan (SHAW) 10-40 MG Historical Med      amoxicillin-clavulanate (AUGMENTIN) 875-125 mg per tablet Take 1 tablet by mouth 2 (two) times a day, Starting Sun 10/27/2024, Until Wed 11/6/2024, Historical Med      buPROPion (WELLBUTRIN SR) 150 mg 12 hr tablet Take 150 mg by mouth 2 (two) times a day, Starting Thu 4/18/2024, Historical Med      Cholecalciferol (Vitamin D3) 1.25 MG (72070 UT) CAPS Starting Fri 4/28/2023, Historical Med      Diclofenac Sodium (VOLTAREN) 1 % Apply 1 application topically, Starting Thu 8/18/2022, Until Fri 7/26/2024 at 2359, Historical Med      furosemide (LASIX) 20 mg tablet Take 20 mg by mouth, Starting Mon 6/17/2024, Historical Med      hydroCHLOROthiazide 25 mg tablet Historical Med      losartan (COZAAR) 100 MG tablet Take 1 tablet (100 mg total) by mouth daily, Starting Wed 10/12/2022, Normal      losartan-hydrochlorothiazide (HYZAAR) 100-25 MG per tablet Starting Thu 4/27/2023, Historical Med      !!  meclizine (ANTIVERT) 25 mg tablet Take 25 mg by mouth Three times daily as needed, Starting Sun 10/27/2024, Until Mon 10/27/2025 at 2359, Historical Med      metoprolol succinate (TOPROL-XL) 50 mg 24 hr tablet Take 50 mg by mouth daily, Starting Fri 4/26/2024, Historical Med      metoprolol tartrate (LOPRESSOR) 50 mg tablet Take 50 mg by mouth 2 (two) times a day, Historical Med      omeprazole (PriLOSEC OTC) 20 MG tablet Take 1 tablet (20 mg total) by mouth daily, Starting Wed 10/12/2022, Normal      !! rosuvastatin (CRESTOR) 20 MG tablet Historical Med      !! rosuvastatin (CRESTOR) 40 MG tablet Historical Med      traMADol (Ultram) 50 mg tablet Take 1 tablet (50 mg total) by mouth every 6 (six) hours as needed for moderate pain, Starting Mon 4/15/2024, Normal      Wegovy 0.25 MG/0.5ML Inject 0.25 mg under the skin once a week, Starting Wed 7/10/2024, Historical Med       !! - Potential duplicate medications found. Please discuss with provider.        STOP taking these medications       predniSONE 20 mg tablet Comments:   Reason for Stopping:             No discharge procedures on file.  ED SEPSIS DOCUMENTATION   Time reflects when diagnosis was documented in both MDM as applicable and the Disposition within this note       Time User Action Codes Description Comment    11/2/2024  6:07 PM Yg Mcneil Add [H93.13] Ringing in ears, bilateral     11/2/2024  6:09 PM Yg Mcneil Add [H93.13] Tinnitus of both ears     11/2/2024  6:09 PM Yg Mcneil Modify [H93.13] Ringing in ears, bilateral     11/2/2024  6:09 PM Yg Mcneil Modify [H93.13] Tinnitus of both ears                  Yg Mcneil,   11/02/24 2100

## 2024-11-04 ENCOUNTER — TELEPHONE (OUTPATIENT)
Dept: PAIN MEDICINE | Facility: CLINIC | Age: 54
End: 2024-11-04

## 2024-11-04 NOTE — TELEPHONE ENCOUNTER
Connected the call with a interpretor  [id number]. A message was left that procedure had to be canceled

## (undated) DEVICE — ULTRASOUND GEL STERILE FOIL PK

## (undated) DEVICE — SYRINGE 10ML LL

## (undated) DEVICE — DRAPE TOWEL: Brand: CONVERTORS

## (undated) DEVICE — NEEDLE 25G X 1 1/2

## (undated) DEVICE — PLASTIC ADHESIVE BANDAGE: Brand: CURITY

## (undated) DEVICE — SYRINGE 5ML LL

## (undated) DEVICE — GLOVE INDICATOR PI UNDERGLOVE SZ 8 BLUE

## (undated) DEVICE — FLEXIBLE ADHESIVE BANDAGE,X-LARGE: Brand: CURITY

## (undated) DEVICE — NEEDLE 18 G X 1 1/2

## (undated) DEVICE — BANDAID SHEER SPOT

## (undated) DEVICE — PROVE COVER: Brand: UNBRANDED

## (undated) DEVICE — DISPOSABLE OR TOWEL: Brand: CARDINAL HEALTH

## (undated) DEVICE — LAPAROTOMY PACK: Brand: CONVERTORS

## (undated) DEVICE — SYRINGE 3ML LL

## (undated) DEVICE — NEEDLE SPINAL 25G X 3.5 IN QUINCKE

## (undated) DEVICE — GLOVE INDICATOR PI UNDERGLOVE SZ 8.5 BLUE

## (undated) DEVICE — CHLORAPREP HI-LITE 10.5ML ORANGE